# Patient Record
Sex: MALE | Race: BLACK OR AFRICAN AMERICAN | NOT HISPANIC OR LATINO | URBAN - METROPOLITAN AREA
[De-identification: names, ages, dates, MRNs, and addresses within clinical notes are randomized per-mention and may not be internally consistent; named-entity substitution may affect disease eponyms.]

---

## 2021-07-11 ENCOUNTER — EMERGENCY (EMERGENCY)
Facility: HOSPITAL | Age: 21
LOS: 1 days | Discharge: HOME | End: 2021-07-11
Attending: EMERGENCY MEDICINE | Admitting: EMERGENCY MEDICINE
Payer: MEDICAID

## 2021-07-11 VITALS
OXYGEN SATURATION: 100 % | RESPIRATION RATE: 16 BRPM | DIASTOLIC BLOOD PRESSURE: 82 MMHG | TEMPERATURE: 98 F | SYSTOLIC BLOOD PRESSURE: 135 MMHG | HEART RATE: 87 BPM

## 2021-07-11 VITALS
TEMPERATURE: 98 F | DIASTOLIC BLOOD PRESSURE: 83 MMHG | HEART RATE: 93 BPM | SYSTOLIC BLOOD PRESSURE: 139 MMHG | OXYGEN SATURATION: 100 % | RESPIRATION RATE: 18 BRPM

## 2021-07-11 DIAGNOSIS — F20.0 PARANOID SCHIZOPHRENIA: ICD-10-CM

## 2021-07-11 LAB
ALBUMIN SERPL ELPH-MCNC: 4.4 G/DL — SIGNIFICANT CHANGE UP (ref 3.3–5)
ALP SERPL-CCNC: 46 U/L — SIGNIFICANT CHANGE UP (ref 40–120)
ALT FLD-CCNC: 28 U/L — SIGNIFICANT CHANGE UP (ref 4–41)
AMPHET UR-MCNC: NEGATIVE — SIGNIFICANT CHANGE UP
ANION GAP SERPL CALC-SCNC: 18 MMOL/L — HIGH (ref 7–14)
APAP SERPL-MCNC: <15 UG/ML — SIGNIFICANT CHANGE UP (ref 15–25)
APPEARANCE UR: CLEAR — SIGNIFICANT CHANGE UP
AST SERPL-CCNC: 22 U/L — SIGNIFICANT CHANGE UP (ref 4–40)
B PERT DNA SPEC QL NAA+PROBE: SIGNIFICANT CHANGE UP
BACTERIA # UR AUTO: NEGATIVE — SIGNIFICANT CHANGE UP
BARBITURATES UR SCN-MCNC: NEGATIVE — SIGNIFICANT CHANGE UP
BASOPHILS # BLD AUTO: 0.03 K/UL — SIGNIFICANT CHANGE UP (ref 0–0.2)
BASOPHILS NFR BLD AUTO: 0.7 % — SIGNIFICANT CHANGE UP (ref 0–2)
BENZODIAZ UR-MCNC: NEGATIVE — SIGNIFICANT CHANGE UP
BILIRUB SERPL-MCNC: 0.3 MG/DL — SIGNIFICANT CHANGE UP (ref 0.2–1.2)
BILIRUB UR-MCNC: NEGATIVE — SIGNIFICANT CHANGE UP
BUN SERPL-MCNC: 14 MG/DL — SIGNIFICANT CHANGE UP (ref 7–23)
C PNEUM DNA SPEC QL NAA+PROBE: SIGNIFICANT CHANGE UP
CALCIUM SERPL-MCNC: 10.1 MG/DL — SIGNIFICANT CHANGE UP (ref 8.4–10.5)
CHLORIDE SERPL-SCNC: 103 MMOL/L — SIGNIFICANT CHANGE UP (ref 98–107)
CO2 SERPL-SCNC: 15 MMOL/L — LOW (ref 22–31)
COCAINE METAB.OTHER UR-MCNC: NEGATIVE — SIGNIFICANT CHANGE UP
COLOR SPEC: YELLOW — SIGNIFICANT CHANGE UP
COVID-19 SPIKE DOMAIN AB INTERP: POSITIVE
COVID-19 SPIKE DOMAIN ANTIBODY RESULT: 0.96 U/ML — HIGH
CREAT SERPL-MCNC: 0.97 MG/DL — SIGNIFICANT CHANGE UP (ref 0.5–1.3)
CREATININE URINE RESULT, DAU: 528 MG/DL — SIGNIFICANT CHANGE UP
DIFF PNL FLD: NEGATIVE — SIGNIFICANT CHANGE UP
EOSINOPHIL # BLD AUTO: 0.1 K/UL — SIGNIFICANT CHANGE UP (ref 0–0.5)
EOSINOPHIL NFR BLD AUTO: 2.3 % — SIGNIFICANT CHANGE UP (ref 0–6)
EPI CELLS # UR: 1 /HPF — SIGNIFICANT CHANGE UP (ref 0–5)
ETHANOL SERPL-MCNC: <10 MG/DL — SIGNIFICANT CHANGE UP
FLUAV SUBTYP SPEC NAA+PROBE: SIGNIFICANT CHANGE UP
FLUBV RNA SPEC QL NAA+PROBE: SIGNIFICANT CHANGE UP
GLUCOSE SERPL-MCNC: 94 MG/DL — SIGNIFICANT CHANGE UP (ref 70–99)
GLUCOSE UR QL: NEGATIVE — SIGNIFICANT CHANGE UP
HADV DNA SPEC QL NAA+PROBE: SIGNIFICANT CHANGE UP
HCOV 229E RNA SPEC QL NAA+PROBE: SIGNIFICANT CHANGE UP
HCOV HKU1 RNA SPEC QL NAA+PROBE: SIGNIFICANT CHANGE UP
HCOV NL63 RNA SPEC QL NAA+PROBE: SIGNIFICANT CHANGE UP
HCOV OC43 RNA SPEC QL NAA+PROBE: SIGNIFICANT CHANGE UP
HCT VFR BLD CALC: 40.7 % — SIGNIFICANT CHANGE UP (ref 39–50)
HGB BLD-MCNC: 13.1 G/DL — SIGNIFICANT CHANGE UP (ref 13–17)
HMPV RNA SPEC QL NAA+PROBE: SIGNIFICANT CHANGE UP
HPIV1 RNA SPEC QL NAA+PROBE: SIGNIFICANT CHANGE UP
HPIV2 RNA SPEC QL NAA+PROBE: SIGNIFICANT CHANGE UP
HPIV3 RNA SPEC QL NAA+PROBE: SIGNIFICANT CHANGE UP
HPIV4 RNA SPEC QL NAA+PROBE: SIGNIFICANT CHANGE UP
HYALINE CASTS # UR AUTO: NEGATIVE — SIGNIFICANT CHANGE UP (ref 0–7)
IANC: 2.21 K/UL — SIGNIFICANT CHANGE UP (ref 1.5–8.5)
IMM GRANULOCYTES NFR BLD AUTO: 0.2 % — SIGNIFICANT CHANGE UP (ref 0–1.5)
KETONES UR-MCNC: NEGATIVE — SIGNIFICANT CHANGE UP
LEUKOCYTE ESTERASE UR-ACNC: NEGATIVE — SIGNIFICANT CHANGE UP
LYMPHOCYTES # BLD AUTO: 1.46 K/UL — SIGNIFICANT CHANGE UP (ref 1–3.3)
LYMPHOCYTES # BLD AUTO: 34 % — SIGNIFICANT CHANGE UP (ref 13–44)
MCHC RBC-ENTMCNC: 26.1 PG — LOW (ref 27–34)
MCHC RBC-ENTMCNC: 32.2 GM/DL — SIGNIFICANT CHANGE UP (ref 32–36)
MCV RBC AUTO: 81.2 FL — SIGNIFICANT CHANGE UP (ref 80–100)
METHADONE UR-MCNC: NEGATIVE — SIGNIFICANT CHANGE UP
MONOCYTES # BLD AUTO: 0.49 K/UL — SIGNIFICANT CHANGE UP (ref 0–0.9)
MONOCYTES NFR BLD AUTO: 11.4 % — SIGNIFICANT CHANGE UP (ref 2–14)
NEUTROPHILS # BLD AUTO: 2.21 K/UL — SIGNIFICANT CHANGE UP (ref 1.8–7.4)
NEUTROPHILS NFR BLD AUTO: 51.4 % — SIGNIFICANT CHANGE UP (ref 43–77)
NITRITE UR-MCNC: NEGATIVE — SIGNIFICANT CHANGE UP
NRBC # BLD: 0 /100 WBCS — SIGNIFICANT CHANGE UP
NRBC # FLD: 0 K/UL — SIGNIFICANT CHANGE UP
OPIATES UR-MCNC: NEGATIVE — SIGNIFICANT CHANGE UP
OXYCODONE UR-MCNC: NEGATIVE — SIGNIFICANT CHANGE UP
PCP SPEC-MCNC: SIGNIFICANT CHANGE UP
PCP UR-MCNC: NEGATIVE — SIGNIFICANT CHANGE UP
PH UR: 6.5 — SIGNIFICANT CHANGE UP (ref 5–8)
PLATELET # BLD AUTO: 270 K/UL — SIGNIFICANT CHANGE UP (ref 150–400)
POTASSIUM SERPL-MCNC: 4.3 MMOL/L — SIGNIFICANT CHANGE UP (ref 3.5–5.3)
POTASSIUM SERPL-SCNC: 4.3 MMOL/L — SIGNIFICANT CHANGE UP (ref 3.5–5.3)
PROT SERPL-MCNC: 7.7 G/DL — SIGNIFICANT CHANGE UP (ref 6–8.3)
PROT UR-MCNC: ABNORMAL
RAPID RVP RESULT: SIGNIFICANT CHANGE UP
RBC # BLD: 5.01 M/UL — SIGNIFICANT CHANGE UP (ref 4.2–5.8)
RBC # FLD: 13.3 % — SIGNIFICANT CHANGE UP (ref 10.3–14.5)
RBC CASTS # UR COMP ASSIST: SIGNIFICANT CHANGE UP /HPF (ref 0–4)
RSV RNA SPEC QL NAA+PROBE: SIGNIFICANT CHANGE UP
RV+EV RNA SPEC QL NAA+PROBE: SIGNIFICANT CHANGE UP
SALICYLATES SERPL-MCNC: <5 MG/DL — LOW (ref 15–30)
SARS-COV-2 IGG+IGM SERPL QL IA: 0.96 U/ML — HIGH
SARS-COV-2 IGG+IGM SERPL QL IA: POSITIVE
SARS-COV-2 RNA SPEC QL NAA+PROBE: SIGNIFICANT CHANGE UP
SODIUM SERPL-SCNC: 136 MMOL/L — SIGNIFICANT CHANGE UP (ref 135–145)
SP GR SPEC: 1.04 — HIGH (ref 1.01–1.02)
THC UR QL: NEGATIVE — SIGNIFICANT CHANGE UP
TOXICOLOGY SCREEN, DRUGS OF ABUSE, SERUM RESULT: SIGNIFICANT CHANGE UP
TSH SERPL-MCNC: 2.66 UIU/ML — SIGNIFICANT CHANGE UP (ref 0.27–4.2)
UROBILINOGEN FLD QL: ABNORMAL
VALPROATE SERPL-MCNC: <3.15 UG/ML — LOW (ref 50–100)
WBC # BLD: 4.3 K/UL — SIGNIFICANT CHANGE UP (ref 3.8–10.5)
WBC # FLD AUTO: 4.3 K/UL — SIGNIFICANT CHANGE UP (ref 3.8–10.5)
WBC UR QL: 2 /HPF — SIGNIFICANT CHANGE UP (ref 0–5)

## 2021-07-11 PROCEDURE — 90792 PSYCH DIAG EVAL W/MED SRVCS: CPT

## 2021-07-11 PROCEDURE — 99283 EMERGENCY DEPT VISIT LOW MDM: CPT

## 2021-07-11 NOTE — ED PROVIDER NOTE - PROGRESS NOTE DETAILS
Andrea WELLINGTON . Medical evaluation performed. There is no clinical evidence of intoxication or any acute medical problem requiring immediate intervention.  D/C to [place] via cab

## 2021-07-11 NOTE — ED ADULT NURSE NOTE - OBJECTIVE STATEMENT
Received pt in  pt calm & cooperative c/o CAH to hurt self pt denies hi/vh presently, pt tolerated breakfast ambulatory awaiting eval on going.

## 2021-07-11 NOTE — ED PROVIDER NOTE - CLINICAL SUMMARY MEDICAL DECISION MAKING FREE TEXT BOX
20 yo M pmh asthma, paranoid schizophrenia, MR, depression presents from [place] for auditory hallucinations and SI for last two weeks.  VSS.  Exam with flat affect, non-toxic appearing.  Denies self harm.  Will obtain  evaluation.  Dispo pending.

## 2021-07-11 NOTE — ED ADULT TRIAGE NOTE - CHIEF COMPLAINT QUOTE
from [facility] 72. states " I am hearing voices telling me to kill myself for 2 wks"

## 2021-07-11 NOTE — ED BEHAVIORAL HEALTH ASSESSMENT NOTE - RISK ASSESSMENT
Low Acute Suicide Risk some increased risk owing to CAH, psychosis, intellectual disability. however, he has no suicidal ideations, is afraid of death/does not want to die, wanted to come to the ED for treatment, no substance misuse. I do not consider him an acute risk to himself warranting involuntary hospitalization, patient declines voluntary admission

## 2021-07-11 NOTE — ED BEHAVIORAL HEALTH ASSESSMENT NOTE - DESCRIPTION
Vital Signs Last 24 Hrs  T(C): 36.7 (11 Jul 2021 08:46), Max: 36.7 (11 Jul 2021 08:46)  T(F): 98 (11 Jul 2021 08:46), Max: 98 (11 Jul 2021 08:46)  HR: 93 (11 Jul 2021 08:46) (93 - 93)  BP: 139/83 (11 Jul 2021 08:46) (139/83 - 139/83)  BP(mean): --  RR: 18 (11 Jul 2021 08:46) (18 - 18)  SpO2: 100% (11 Jul 2021 08:46) (100% - 100%) htn, increased lipids was homeless, now living in [facility]

## 2021-07-11 NOTE — ED BEHAVIORAL HEALTH NOTE - BEHAVIORAL HEALTH NOTE
Per provider, Dr. Vincent, patient is cleared and is able to return to their previous residence [address].  has spoken to [employer]  [phone],  confirmed that patients mode of transportation is [employer], and that patient travels INDEPENDENTLY. Clinical provider is in agreement with [employer]  back to group home. Verbal huddle regarding coordination of care completed with interdisciplinary team. Worker arranged for safe ride taxi ([phone]) for patient to return back to residence. Invoice #[id number]

## 2021-07-11 NOTE — ED PROVIDER NOTE - PHYSICAL EXAMINATION
GEN:  Non-toxic appearing, non-distressed, speaking full sentences, non-diaphoretic, AAOx3  HEENT:  NCAT, neck supple, EOMI, PERRLA, sclera anicteric, no conjunctival pallor or injection, no stridor, normal voice, no tonsillar exudate, uvula midline  CV:  regular rhythm and rate, s1/s2 audible, no murmurs, rubs or gallops, peripheral pulses 2+ and symmetric  PULM:  non-labored respirations, lungs clear to auscultation bilaterally, no wheezes, crackles or rales  ABD:  non distended, non-tender, no rebound, no guarding, negative Umana's sign, bowel sounds normal, no cvat  MSK:  no gross deformity, non-tender extremities and joints, range of motion grossly normal appearing, no extremity edema, extremities warm and well perfused   NEURO:  AAOx3, CN II-XII intact, motor 5/5 in upper and lower extremities bilaterally, sensation grossly intact in extremities and trunk, finger to nose testing wnl, no nystagmus, negative Romberg, no pronator drift, no gait deficit  SKIN:  warm, dry, no rash or vesicles

## 2021-07-11 NOTE — ED BEHAVIORAL HEALTH ASSESSMENT NOTE - SUMMARY
21 year-old with htn, increased lipids, intellectual disability and schizophrenia, living in [facility], with recent CAH in the context, perhaps of switching from haloperidol/VPA to olanzapine 20 mg. Patient denies any thoughts of hurting himself, reports that the hallucinations he has had before, "in the background" and is able to ignore them, presents requesting restarting previous meds 21 year-old with htn, increased lipids, intellectual disability and schizophrenia, living in [facility], with recent CAH in the context, perhaps of switching from haloperidol/VPA to olanzapine 20 mg. Patient denies any thoughts of hurting himself, reports that the hallucinations he has had before, "in the background" and is able to ignore them, presents requesting restarting previous meds. have discussed with staff at [facility], no acute safety concerns. I do not consider patient an acute risk to himself warranting involuntary psychiatric hospitalization, and patient declines voluntary admission. Plan is discharge with follow-up at [facility].

## 2021-07-11 NOTE — ED PROVIDER NOTE - OBJECTIVE STATEMENT
20 yo M pmh asthma, paranoid schizophrenia, MR, depression presents from [place] for auditory hallucinations and SI for last two weeks.  Patient states he is hearing voices commanding him to kill himself.  Reports history of suicidal ideation, but no previous attempts.  States he has a plan to jump off a bridge.  Denies recent self harm.  Denies etoh or illicit drug use.  Reports compliance with medication although is unsure of what he is taking.

## 2021-07-11 NOTE — ED BEHAVIORAL HEALTH ASSESSMENT NOTE - HPI (INCLUDE ILLNESS QUALITY, SEVERITY, DURATION, TIMING, CONTEXT, MODIFYING FACTORS, ASSOCIATED SIGNS AND SYMPTOMS)
21 year-old male, history of schizophrenia and comorbid intellectual disability, htn, increased lipids, living at [facility]/ [address] at [place], last seen in this ED 2014 while living in shelter with mother, no history of substance abuse, no history of self injury or suicide attempts, history of previous psychiatric hospitalization (most recent according to PSYCHES 8-9/2017 at [place]), treated by ACT team ([facility]) and Dr. Lu, was treated with  haloperidol 10 mg bid (formerly on dec) in addition to cogentin 2 mg,  mg. He is now off of haloperidol/VPA, and is on olanzapine 20 mg. Patient presents today, self-activating 911 as he reports that he has had "a pain in my head and I hear a voice telling me to jump off a bridge" for the past two weeks. He reports that he does not want to hurt himself, has heard this voice before off and on for several years, but believes that the voice got worse "after my doctor took me off of two medications" (haloperidol/VPA?). He reports that he last had a telehealth visit with his doctor a couple of weeks ago, reports that it is hard to get in touch with his doctor in between visits. He reports that he has not been sad or depressed, does not want to be in the hospital, came to the ED "to get those medicines back."   Spoke with Mari at [facility] ([phone]) and she reports that patient has not been distressed, has been calm and cooperative, has not appeared upset or depressed and was surprised when he reported hearing voices. She corroborates that he has been adherent with treatment, and that he has had no self injury. 21 year-old male, history of schizophrenia and comorbid intellectual disability, htn, increased lipids, living at [facility]/ [facility] [address] residence at [place], last seen in this ED 2014 while living in shelter with mother, no history of substance abuse, no history of self injury or suicide attempts, history of previous psychiatric hospitalization (most recent according to PSYCHES 8-9/2017 at [place]), treated by ACT team ([facility]) and Dr. Lu, was treated with  haloperidol 10 mg bid (formerly on dec) in addition to cogentin 2 mg,  mg. He is now off of haloperidol/VPA, and is on olanzapine 20 mg. Patient presents today, self-activating 911 as he reports that he has had "a pain in my head and I hear a voice telling me to jump off a bridge" for the past two weeks. He reports that he does not want to hurt himself, has heard this voice before off and on for several years, but believes that the voice got worse "after my doctor took me off of two medications" (haloperidol/VPA?). He reports that he last had a telehealth visit with his doctor a couple of weeks ago, reports that it is hard to get in touch with his doctor in between visits. He reports that he has not been sad or depressed, does not want to be in the hospital, came to the ED "to get those medicines back."   Spoke with Mari at [facility] ([phone]) and she reports that patient has not been distressed, has been calm and cooperative, has not appeared upset or depressed and was surprised when he reported hearing voices. She corroborates that he has been adherent with treatment, and that he has had no self injury. Also spoke with Daniella, medication nurse, who reports that he believes that VPA and haloperidol were discontinued owing to "side-effects" but the patient is unaware of any side-effects, and Daniella deferred to Dr. Lu at [facility]. I attempted to make contact with Dr. Zuniga at [facility] at [phone], left message.

## 2021-07-13 NOTE — ED POST DISCHARGE NOTE - REASON FOR FOLLOW-UP
COVID-19 AB : detected. message left with Call Back  P.A. number and hours for return call back. Other

## 2021-09-03 ENCOUNTER — EMERGENCY (EMERGENCY)
Facility: HOSPITAL | Age: 21
LOS: 1 days | Discharge: HOME | End: 2021-09-03
Attending: EMERGENCY MEDICINE | Admitting: EMERGENCY MEDICINE
Payer: MEDICAID

## 2021-09-03 VITALS
HEART RATE: 90 BPM | TEMPERATURE: 98 F | DIASTOLIC BLOOD PRESSURE: 60 MMHG | OXYGEN SATURATION: 100 % | RESPIRATION RATE: 16 BRPM | SYSTOLIC BLOOD PRESSURE: 120 MMHG

## 2021-09-03 PROCEDURE — 99284 EMERGENCY DEPT VISIT MOD MDM: CPT | Mod: 25

## 2021-09-03 PROCEDURE — 93010 ELECTROCARDIOGRAM REPORT: CPT

## 2021-09-03 RX ORDER — ONDANSETRON 8 MG/1
4 TABLET, FILM COATED ORAL ONCE
Refills: 0 | Status: COMPLETED | OUTPATIENT
Start: 2021-09-03 | End: 2021-09-03

## 2021-09-03 RX ORDER — FAMOTIDINE 10 MG/ML
20 INJECTION INTRAVENOUS ONCE
Refills: 0 | Status: COMPLETED | OUTPATIENT
Start: 2021-09-03 | End: 2021-09-03

## 2021-09-03 RX ADMIN — ONDANSETRON 4 MILLIGRAM(S): 8 TABLET, FILM COATED ORAL at 14:47

## 2021-09-03 RX ADMIN — Medication 30 MILLILITER(S): at 14:47

## 2021-09-03 RX ADMIN — FAMOTIDINE 20 MILLIGRAM(S): 10 INJECTION INTRAVENOUS at 14:47

## 2021-09-03 NOTE — ED PROVIDER NOTE - CLINICAL SUMMARY MEDICAL DECISION MAKING FREE TEXT BOX
O'Nathan DO PGY-2: pt w/ one year of abd pain and cp (both described as burning). CP not made worse w/ exertion. States it is just constant and baseline. Probable GERD sky since pt only consumed coffee today. Will obtain screening ekg. Ordered GI meds. TBDC w/ GI follow up.

## 2021-09-03 NOTE — ED ADULT NURSE NOTE - OBJECTIVE STATEMENT
Pt received to INT10[facility]. A&Ox4. Ambulatory at baseline.  Pt c/o of abdominal pain for 1x year that has worsened. Pain 8/10 at rest and with activity. Pt states he didn't take anything to relieve the pain. Pt states he has SOB and midsternal chest pain and the pain that radiates bilaterally to the arms. Pt denies any N/V. Pt has flat affect with one word responses. Respirations are even, unlabored, no use of accessory muscles. Abdomen soft and nontender. Skin warm, dry and intact. Medicated as per MD. Deondre CTM.

## 2021-09-03 NOTE — ED PROVIDER NOTE - NSFOLLOWUPCLINICS_GEN_ALL_ED_FT
[facility]  Cardiology  [address] [place] [address]  Phone: [phone]  Fax:   Follow Up Time: 1-3 Days    Gastroenterology at [facility]  Gastroenterology  [address] [place] [address]  Phone: [phone]  Fax:   Follow Up Time: 1-3 Days

## 2021-09-03 NOTE — PROVIDER CONTACT NOTE (OTHER) - ASSESSMENT
Pt is from [address], spoke with [place] Counselor at [phone], confirmed he is a resident, and asked to send him by bus, I gave him a metro card, pt walked out. Verbal huddle completed.

## 2021-09-03 NOTE — ED ADULT TRIAGE NOTE - CHIEF COMPLAINT QUOTE
Pt states that he has been having abd pain for the last year, which made him nauseous today.  PMH schizophrenia

## 2021-09-03 NOTE — ED PROVIDER NOTE - NS ED ROS FT
CONSTITUTIONAL - No fever, No diaphoresis, No weight change  EYES - No eye pain, No blurred vision  ENT - No change in hearing, No sore throat, No neck pain, No rhinorrhea, No ear pain  RESPIRATORY - No shortness of breath, No cough  CARDIAC +chest pain, No palpitations  GI +abdominal pain, +nausea, No vomiting, No diarrhea, No constipation  - No dysuria, no frequency, no hematuria.   MUSCULOSKELETAL - No joint pain, No swelling, No back pain  NEUROLOGIC - No numbness, No focal weakness, No headache, No dizziness

## 2021-09-03 NOTE — ED PROVIDER NOTE - ATTENDING CONTRIBUTION TO CARE
DR. CHINCHILLA, ATTENDING MD-  I performed a face to face bedside interview with the patient regarding history of present illness, review of symptoms and past medical history. I completed an independent physical exam.  I have discussed the patient's plan of care with the resident.   Documentation as above in the note.    22 y/o male h/o schizophrenia here with abd pain x1 year, epigastric, burning, rad to chest occasionally.  Follows with a gi md o/p.  Abd soft non ttp no r/g neg murphys.  Likely gerd/gastritis.  Give gi cocktail obtain ekg antic dc with pcp and gi f/u as o/p.

## 2021-09-03 NOTE — ED PROVIDER NOTE - OBJECTIVE STATEMENT
22 y/o M w/ pmhx of schizophrenia presents today c/o abd pain and cp that has been going on for one year. Pain is both described as a burning sensation. CP not made worse w/ exertion. States it is just constant and "always the same". For abd it is localized to epigastric area. Pt states he came in today bc he felt nauseous for the first time. Denies vomiting. Pt denies recent f/c, dysuria, diarrhea. Pt does not know what psych meds he is on. He states he is from [place]. Pt denies SI or HI. Pt denies tobacco, alcohol or recreational drug use

## 2021-09-03 NOTE — ED PROVIDER NOTE - PATIENT PORTAL LINK FT
You can access the FollowMyHealth Patient Portal offered by [facility] by registering at the following website: [email or web address]. By joining [facility]’s FollowMyHealth portal, you will also be able to view your health information using other applications (apps) compatible with our system.

## 2021-09-03 NOTE — ED PROVIDER NOTE - NSFOLLOWUPINSTRUCTIONS_ED_ALL_ED_FT
(1) Follow up with your primary care physician as discussed. Listed below are the specialists that will be necessary to see as an outpatient to continue the workup.  Please call the numbers listed below or [phone] to set up the necessary appointments.  (2) Immediately seek care at your nearest emergency room if your symptoms worsen, persist, or do not resolve   (3) Take Tylenol as needed for pain per the dosing instructions on the bottle.      Acute Nausea and Vomiting    WHAT YOU NEED TO KNOW:    Acute nausea and vomiting start suddenly, worsen quickly, and last a short time.    DISCHARGE INSTRUCTIONS:    Return to the emergency department if:   •You see blood in your vomit or your bowel movements.  •You have sudden, severe pain in your chest and upper abdomen after hard vomiting or retching.  •You have swelling in your neck and chest.   •You are dizzy, cold, and thirsty and your eyes and mouth are dry.  •You are urinating very little or not at all.  •You have muscle weakness, leg cramps, and trouble breathing.   •Your heart is beating much faster than normal.   •You continue to vomit for more than 48 hours.     Contact your healthcare provider if:   •You have frequent dry heaves (vomiting but nothing comes out).  •Your nausea and vomiting does not get better or go away after you use medicine.  •You have questions or concerns about your condition or treatment. (1) Follow up with your primary care physician as discussed. Listed below are the specialists that will be necessary to see as an outpatient to continue the workup.  Please call the numbers listed below or [phone] to set up the necessary appointments.  (2) Immediately seek care at your nearest emergency room if your symptoms worsen, persist, or do not resolve   (3) Take Tylenol as needed for pain per the dosing instructions on the bottle.  (4) Take famotidine 10mg twice a day for the abdominal pain.       Acute Nausea and Vomiting    WHAT YOU NEED TO KNOW:    Acute nausea and vomiting start suddenly, worsen quickly, and last a short time.    DISCHARGE INSTRUCTIONS:    Return to the emergency department if:   •You see blood in your vomit or your bowel movements.  •You have sudden, severe pain in your chest and upper abdomen after hard vomiting or retching.  •You have swelling in your neck and chest.   •You are dizzy, cold, and thirsty and your eyes and mouth are dry.  •You are urinating very little or not at all.  •You have muscle weakness, leg cramps, and trouble breathing.   •Your heart is beating much faster than normal.   •You continue to vomit for more than 48 hours.     Contact your healthcare provider if:   •You have frequent dry heaves (vomiting but nothing comes out).  •Your nausea and vomiting does not get better or go away after you use medicine.  •You have questions or concerns about your condition or treatment.

## 2021-09-03 NOTE — ED PROVIDER NOTE - PROGRESS NOTE DETAILS
Na WOLFE PGY-2: Results explained to patient. Explained strict return precautions. OEliza DO PGY-2: contacted SW for dc as pt is from [facility]

## 2021-10-05 ENCOUNTER — EMERGENCY (EMERGENCY)
Facility: HOSPITAL | Age: 21
LOS: 1 days | Discharge: HOME | End: 2021-10-05
Attending: EMERGENCY MEDICINE | Admitting: EMERGENCY MEDICINE
Payer: MEDICAID

## 2021-10-05 VITALS
SYSTOLIC BLOOD PRESSURE: 129 MMHG | HEART RATE: 109 BPM | OXYGEN SATURATION: 99 % | DIASTOLIC BLOOD PRESSURE: 88 MMHG | TEMPERATURE: 98 F | RESPIRATION RATE: 16 BRPM

## 2021-10-05 PROCEDURE — 99284 EMERGENCY DEPT VISIT MOD MDM: CPT

## 2021-10-05 NOTE — ED PROVIDER NOTE - NSFOLLOWUPINSTRUCTIONS_ED_ALL_ED_FT
Please return to the ER if you want to harm yourself or others or if you feel unsafe for any reason.
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
## 2021-10-05 NOTE — ED BEHAVIORAL HEALTH NOTE - BEHAVIORAL HEALTH NOTE
Writer called [address] [place] [address] [phone] spoke to [facility] rehab associate.  He states patient resides independently in congregate apartment treatment.  Patient has been in his own apartment since February 2021.  Patient has his own kitchen and bathroom.  Pt takes care of purchasing his own food and cooking his own food.  Patient has been functioning independently since he's been at [address] [place] [address].  He states patient seemed agitated this morning, requested to go to the hospital today.  He states patient spoke to  staff then went outside the building.   staff called EMS.  He does not know if patient has any specific grievance with another client.  He does not know if patient threatened any other client specifically.  He states  might have more information as to wether patient has an issue with another client, but they are short staffed and there are no  until 2:30pm today.  He states patient has been compliant with medication.  Last night patient told staff he was going for an overnight, packed his belongings and medications for today.  He states for some reason patient did not leave.  Patient does spend a few overnights a week with family. He doesn't know what transpired last night that patient did not go to his family.  He states patient travels independently and can return via public transportation.
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
## 2021-10-05 NOTE — ED PROVIDER NOTE - PHYSICAL EXAMINATION
GEN - NAD; well appearing; A+O x3   HEAD - NC/AT   ENT: Airway patent, mmm  NECK: Neck supple  PULMONARY - Non labored breathing  EXTREMITIES - moving all four extremities  NEUROLOGIC - alert, speech clear, normal gait  PSYCH -calm and cooperative, answering questions appropriately

## 2021-10-05 NOTE — ED PROVIDER NOTE - PATIENT PORTAL LINK FT
You can access the FollowMyHealth Patient Portal offered by [facility] by registering at the following website: [email or web address]. By joining [facility]’s FollowMyHealth portal, you will also be able to view your health information using other applications (apps) compatible with our system.

## 2021-10-05 NOTE — ED PROVIDER NOTE - CLINICAL SUMMARY MEDICAL DECISION MAKING FREE TEXT BOX
Pt presents from [place] for eval after episode of agitation. Pt calm and cooperative now, currently not a danger to self or others, will send back to [place].

## 2021-11-21 ENCOUNTER — EMERGENCY (EMERGENCY)
Facility: HOSPITAL | Age: 21
LOS: 1 days | Discharge: HOME | End: 2021-11-21
Attending: STUDENT IN AN ORGANIZED HEALTH CARE EDUCATION/TRAINING PROGRAM | Admitting: STUDENT IN AN ORGANIZED HEALTH CARE EDUCATION/TRAINING PROGRAM
Payer: MEDICAID

## 2021-11-21 VITALS
TEMPERATURE: 98 F | SYSTOLIC BLOOD PRESSURE: 134 MMHG | DIASTOLIC BLOOD PRESSURE: 96 MMHG | RESPIRATION RATE: 16 BRPM | OXYGEN SATURATION: 100 % | HEART RATE: 85 BPM

## 2021-11-21 VITALS
DIASTOLIC BLOOD PRESSURE: 86 MMHG | HEART RATE: 80 BPM | TEMPERATURE: 98 F | RESPIRATION RATE: 18 BRPM | SYSTOLIC BLOOD PRESSURE: 130 MMHG | OXYGEN SATURATION: 100 %

## 2021-11-21 PROCEDURE — 99284 EMERGENCY DEPT VISIT MOD MDM: CPT

## 2021-11-21 RX ORDER — ONDANSETRON 8 MG/1
4 TABLET, FILM COATED ORAL ONCE
Refills: 0 | Status: COMPLETED | OUTPATIENT
Start: 2021-11-21 | End: 2021-11-21

## 2021-11-21 RX ORDER — PANTOPRAZOLE SODIUM 20 MG/1
1 TABLET, DELAYED RELEASE ORAL
Qty: 10 | Refills: 0
Start: 2021-11-21 | End: 2021-11-30

## 2021-11-21 RX ORDER — FAMOTIDINE 10 MG/ML
1 INJECTION INTRAVENOUS
Qty: 10 | Refills: 0
Start: 2021-11-21 | End: 2021-11-30

## 2021-11-21 RX ORDER — ACETAMINOPHEN 500 MG
650 TABLET ORAL ONCE
Refills: 0 | Status: COMPLETED | OUTPATIENT
Start: 2021-11-21 | End: 2021-11-21

## 2021-11-21 RX ORDER — FAMOTIDINE 10 MG/ML
20 INJECTION INTRAVENOUS ONCE
Refills: 0 | Status: COMPLETED | OUTPATIENT
Start: 2021-11-21 | End: 2021-11-21

## 2021-11-21 RX ORDER — LIDOCAINE 4 G/100G
5 CREAM TOPICAL ONCE
Refills: 0 | Status: COMPLETED | OUTPATIENT
Start: 2021-11-21 | End: 2021-11-21

## 2021-11-21 RX ORDER — SUCRALFATE 1 G
1 TABLET ORAL ONCE
Refills: 0 | Status: COMPLETED | OUTPATIENT
Start: 2021-11-21 | End: 2021-11-21

## 2021-11-21 RX ADMIN — LIDOCAINE 5 MILLILITER(S): 4 CREAM TOPICAL at 07:15

## 2021-11-21 RX ADMIN — Medication 1 GRAM(S): at 07:15

## 2021-11-21 RX ADMIN — Medication 650 MILLIGRAM(S): at 07:15

## 2021-11-21 RX ADMIN — FAMOTIDINE 20 MILLIGRAM(S): 10 INJECTION INTRAVENOUS at 07:15

## 2021-11-21 RX ADMIN — ONDANSETRON 4 MILLIGRAM(S): 8 TABLET, FILM COATED ORAL at 07:49

## 2021-11-21 RX ADMIN — Medication 30 MILLILITER(S): at 07:15

## 2021-11-21 NOTE — ED PROVIDER NOTE - NS ED ROS FT
Gen: No F/C/NS  Head: No falls/head trauma  Eyes: No changes in vision   Resp: No cough or trouble breathing  Cardiovascular: No chest pain or palpitation  Gastroenteric: +nausea, +vomting   :  No change in urine output, dysuria or hematuria   MS: No joint or muscle pain  Neuro: No headache   Skin: No new rash

## 2021-11-21 NOTE — ED PROVIDER NOTE - PHYSICAL EXAMINATION
G: NAD, cooperative with exam   H: NCAT  E: EOMI, no conjunctival pallor   M: Mucous membranes moist   R: CTABL, nWOB  C: Nl S1/S2, no mrg  A: Soft, NT/ND, no rebound/guarding   MSK: no calf tenderness, no LE edema

## 2021-11-21 NOTE — ED PROVIDER NOTE - PROGRESS NOTE DETAILS
Rosetta Walker MD (PGY2) -  Pt seen & reassessed.  Pt symptomatically improved.  NAD, VSS, pt tolerated PO & ambulated w/steady unassisted gait in the ED.  We discussed the results of ED w/u w/patient (incl. presumptive Emergency Department dx, associated anticipatory guidance, stressing importance of prompt f/u, return precautions), & gave them a copy of results.  Patient verbalized understanding of ED course & agreed with our f/u recommendations, has decisional making capacity.  Pt st they will f/u w/PMD within the next 3 days; pt agrees to call today or tomorrow for an appointment. Pt agrees to return to the ED if there is any worsening or concerning symptoms. SW called for disposition.

## 2021-11-21 NOTE — ED PROVIDER NOTE - NSFOLLOWUPCLINICS_GEN_ALL_ED_FT
Gastroenterology at [facility]  Gastroenterology  [address] [place] [address]  Phone: [phone]  Fax:

## 2021-11-21 NOTE — PROVIDER CONTACT NOTE (OTHER) - ASSESSMENT
Rosetta Walker) referred this case as pt is from [facility] . Writer resides at [facility] [phone].  Writer spoke with renetta Guy  at [phone]  and requested send the pt via cab.  Writer arranged trip via  [employer] online portal #  trip invoice number #  [id number].  Writer reached out to [employer] at [phone] spoke with Ms. Mandujano informed that pt will be picked up at 9:00 am and writer and informed cab has to come to adult ED ramp to . Writer informed the medical team and the pt on this intervention and plan and in agreement. Verbal Huddle been completed. NO further SW intervention needed for this visit. Rosetta Walker) referred this case as pt is from [facility] . Writer resides at [facility] [phone].  Writer spoke with counselor Malena  at [phone]  and requested send the pt via cab.  Writer arranged trip via  [employer] online portal #  trip invoice number #  [id number].  Writer reached out to [employer] at [phone] spoke with Ms. Mandujano informed that pt will be picked up at 9:00 am and writer and informed cab has to come to adult ED ramp to . Writer informed the medical team and the pt on this intervention and plan and in agreement. Verbal Huddle been completed. NO further SW intervention needed for this visit.

## 2021-11-21 NOTE — ED PROVIDER NOTE - ATTENDING CONTRIBUTION TO CARE
I (Sonal) agree with above, I performed a history and physical. Counseled purvi medical staff, physician assistant, and/or medical student on medical decision making as documented. Medical decisions and treatment interventions were made in real time during the patient encounter. Additionally and/or with the following exceptions: The patient presented to the ED with burning epigastric pain that has been ongoing for 1 month, worse after food. Has had a endoscopy in the past which was significant for gastritis, as per the patient. abdomen soft, non-tender, non-distended . Patient felt better after treatment for gastritis, given famotidine rx and recommended GI follow up. BRIANA avina performed, signed out to ouncoming attending pending final release via cab back to [facility]. Return precautions reviewed. Patient verbalized understand of conditions for return and plan for follow up.

## 2021-11-21 NOTE — ED ADULT NURSE NOTE - CHIEF COMPLAINT QUOTE
Arrives from [address]. C/o midsternal chest pain that began "months ago." Also c/o vomiting that began tonight. PMHx: asthma, schizophrenia, GERD

## 2021-11-21 NOTE — ED PROVIDER NOTE - CLINICAL SUMMARY MEDICAL DECISION MAKING FREE TEXT BOX
20yo M PMH hiatal hernia, schizophrenia, GERD presents with epigastric burning sensation, nausea and vomiting. Well appearing on exam. Abd soft NTND. No rash. Plan to give meds, reassess.

## 2021-11-21 NOTE — ED ADULT NURSE NOTE - OBJECTIVE STATEMENT
pt received to 21, a&ox 4 and ambulatory, p/w chest pain which pt stated started months ago. Verbalized nausea and vomit since 1am this morning. Denies bloody vomit. Abdomen soft, non-tender, non-distended with positive bowel sounds on all 4 quadrants. Pt breathing even and unlabored on room air. NSR on cardiac monitor. Denies fever, chills, cough, SOB,  palpitations, constipation. Meds administered as ordered. Stretcher in lowest position, wheels locked, appropriate side rails in place, call bell in reach.

## 2021-11-21 NOTE — ED ADULT TRIAGE NOTE - CHIEF COMPLAINT QUOTE
Arrives from [address]. C/o midsternal chest pain that began "months ago." Also c/o vomiting that began tonight. PMHx: asthma, schizophrenia, GERD

## 2021-11-21 NOTE — ED PROVIDER NOTE - PATIENT PORTAL LINK FT
You can access the FollowMyHealth Patient Portal offered by [facility] by registering at the following website: [email or web address]. By joining [employer]’s FollowMyHealth portal, you will also be able to view your health information using other applications (apps) compatible with our system.

## 2021-11-21 NOTE — ED PROVIDER NOTE - NSFOLLOWUPINSTRUCTIONS_ED_ALL_ED_FT
Please take Pepcid one time per day for your symptoms.  Please follow up with your gastroenterologist.     Gastritis    WHAT YOU NEED TO KNOW:    Gastritis is inflammation or irritation of the lining of your stomach.     Abdominal Organs         DISCHARGE INSTRUCTIONS:    Call 911 for any of the following:   •You develop chest pain or shortness of breath.          Return to the emergency department if:   •You vomit blood.      •You have black or bloody bowel movements.      •You have severe stomach or back pain.      Contact your healthcare provider if:   •You have a fever.      •You have new or worsening symptoms, even after treatment.      •You have questions or concerns about your condition or care.      Medicines:   •Medicines may be given to help treat a bacterial infection or decrease stomach acid.       •Take your medicine as directed. Contact your healthcare provider if you think your medicine is not helping or if you have side effects. Tell him or her if you are allergic to any medicine. Keep a list of the medicines, vitamins, and herbs you take. Include the amounts, and when and why you take them. Bring the list or the pill bottles to follow-up visits. Carry your medicine list with you in case of an emergency.      Manage or prevent gastritis:   •Do not smoke. Nicotine and other chemicals in cigarettes and cigars can make your symptoms worse and cause lung damage. Ask your healthcare provider for information if you currently smoke and need help to quit. E-cigarettes or smokeless tobacco still contain nicotine. Talk to your healthcare provider before you use these products.       •Do not drink alcohol. Alcohol can prevent healing and make your gastritis worse. Talk to your healthcare provider if you need help to stop drinking.      •Do not take NSAIDs or aspirin unless directed. These and similar medicines can cause irritation. If your healthcare provider says it is okay to take NSAIDs, take them with food.       •Do not eat foods that cause irritation. Foods such as oranges and salsa can cause burning or pain. Eat a variety of healthy foods. Examples include fruits (not citrus), vegetables, low-fat dairy products, beans, whole-grain breads, and lean meats and fish. Try to eat small meals, and drink water with your meals. Do not eat for at least 3 hours before you go to bed.      •Find ways to relax and decrease stress. Stress can increase stomach acid and make gastritis worse. Activities such as yoga, meditation, or listening to music can help you relax. Spend time with friends, or do things you enjoy.      Follow up with your healthcare provider as directed: You may need ongoing tests or treatment, or referral to a gastroenterologist. Write down your questions so you remember to ask them during your visits.

## 2021-11-21 NOTE — ED PROVIDER NOTE - OBJECTIVE STATEMENT
Detail Level: Detailed Detail Level: Zone 22yo M PMH hiatal hernia, schizophrenia, GERD presents with epigastric burning sensation, nausea and vomiting. Onset 1am, woke him from sleep. States he vomited 7 times, went back to sleep. Woke up at 5:30am with the same sxs, vomited once. Has not taken any meds for relief of symptoms. Endorsing nausea. Burning sensation in his chest and epigastrium. Ongoing for 2 years, burning sensation occurs after every feed. Seen by GI 2 yrs ago, had an endoscopy, informed he had a hiatal hernia at that time. Has not followed up since. Ate BLT yesterday. Did not have anything for dinner. No SOB, palpitations, abd pain, diarrhea, sick contacts. Detail Level: Generalized

## 2022-01-19 NOTE — ED ADULT NURSE NOTE - NSSEPSISSUSPECTED_ED_A_ED
- patient required urinary catheterization multiple times last night    - continue urinary retention protocol   - Flomax initiated No

## 2022-04-08 ENCOUNTER — EMERGENCY (EMERGENCY)
Facility: HOSPITAL | Age: 22
LOS: 1 days | Discharge: HOME | End: 2022-04-08
Attending: EMERGENCY MEDICINE | Admitting: EMERGENCY MEDICINE
Payer: MEDICAID

## 2022-04-08 VITALS
RESPIRATION RATE: 20 BRPM | OXYGEN SATURATION: 100 % | TEMPERATURE: 98 F | DIASTOLIC BLOOD PRESSURE: 94 MMHG | SYSTOLIC BLOOD PRESSURE: 198 MMHG | HEART RATE: 95 BPM

## 2022-04-08 VITALS
DIASTOLIC BLOOD PRESSURE: 86 MMHG | RESPIRATION RATE: 18 BRPM | TEMPERATURE: 98 F | HEART RATE: 74 BPM | SYSTOLIC BLOOD PRESSURE: 122 MMHG | OXYGEN SATURATION: 100 %

## 2022-04-08 LAB
ALBUMIN SERPL ELPH-MCNC: 4.4 G/DL — SIGNIFICANT CHANGE UP (ref 3.3–5)
ALP SERPL-CCNC: 53 U/L — SIGNIFICANT CHANGE UP (ref 40–120)
ALT FLD-CCNC: 19 U/L — SIGNIFICANT CHANGE UP (ref 4–41)
ANION GAP SERPL CALC-SCNC: 13 MMOL/L — SIGNIFICANT CHANGE UP (ref 7–14)
APPEARANCE UR: CLEAR — SIGNIFICANT CHANGE UP
AST SERPL-CCNC: 16 U/L — SIGNIFICANT CHANGE UP (ref 4–40)
BASOPHILS # BLD AUTO: 0.03 K/UL — SIGNIFICANT CHANGE UP (ref 0–0.2)
BASOPHILS NFR BLD AUTO: 0.4 % — SIGNIFICANT CHANGE UP (ref 0–2)
BILIRUB SERPL-MCNC: <0.2 MG/DL — SIGNIFICANT CHANGE UP (ref 0.2–1.2)
BILIRUB UR-MCNC: NEGATIVE — SIGNIFICANT CHANGE UP
BUN SERPL-MCNC: 15 MG/DL — SIGNIFICANT CHANGE UP (ref 7–23)
CALCIUM SERPL-MCNC: 9.4 MG/DL — SIGNIFICANT CHANGE UP (ref 8.4–10.5)
CHLORIDE SERPL-SCNC: 104 MMOL/L — SIGNIFICANT CHANGE UP (ref 98–107)
CO2 SERPL-SCNC: 22 MMOL/L — SIGNIFICANT CHANGE UP (ref 22–31)
COLOR SPEC: YELLOW — SIGNIFICANT CHANGE UP
CREAT SERPL-MCNC: 1 MG/DL — SIGNIFICANT CHANGE UP (ref 0.5–1.3)
DIFF PNL FLD: NEGATIVE — SIGNIFICANT CHANGE UP
EGFR: 108 ML/MIN/1.73M2 — SIGNIFICANT CHANGE UP
EOSINOPHIL # BLD AUTO: 0.21 K/UL — SIGNIFICANT CHANGE UP (ref 0–0.5)
EOSINOPHIL NFR BLD AUTO: 2.8 % — SIGNIFICANT CHANGE UP (ref 0–6)
GLUCOSE SERPL-MCNC: 78 MG/DL — SIGNIFICANT CHANGE UP (ref 70–99)
GLUCOSE UR QL: NEGATIVE — SIGNIFICANT CHANGE UP
HCT VFR BLD CALC: 40 % — SIGNIFICANT CHANGE UP (ref 39–50)
HGB BLD-MCNC: 12.9 G/DL — LOW (ref 13–17)
IANC: 5.34 K/UL — SIGNIFICANT CHANGE UP (ref 1.8–7.4)
IMM GRANULOCYTES NFR BLD AUTO: 0.3 % — SIGNIFICANT CHANGE UP (ref 0–1.5)
KETONES UR-MCNC: NEGATIVE — SIGNIFICANT CHANGE UP
LEUKOCYTE ESTERASE UR-ACNC: NEGATIVE — SIGNIFICANT CHANGE UP
LYMPHOCYTES # BLD AUTO: 1.59 K/UL — SIGNIFICANT CHANGE UP (ref 1–3.3)
LYMPHOCYTES # BLD AUTO: 20.8 % — SIGNIFICANT CHANGE UP (ref 13–44)
MCHC RBC-ENTMCNC: 25.3 PG — LOW (ref 27–34)
MCHC RBC-ENTMCNC: 32.3 GM/DL — SIGNIFICANT CHANGE UP (ref 32–36)
MCV RBC AUTO: 78.4 FL — LOW (ref 80–100)
MONOCYTES # BLD AUTO: 0.44 K/UL — SIGNIFICANT CHANGE UP (ref 0–0.9)
MONOCYTES NFR BLD AUTO: 5.8 % — SIGNIFICANT CHANGE UP (ref 2–14)
NEUTROPHILS # BLD AUTO: 5.34 K/UL — SIGNIFICANT CHANGE UP (ref 1.8–7.4)
NEUTROPHILS NFR BLD AUTO: 69.9 % — SIGNIFICANT CHANGE UP (ref 43–77)
NITRITE UR-MCNC: NEGATIVE — SIGNIFICANT CHANGE UP
NRBC # BLD: 0 /100 WBCS — SIGNIFICANT CHANGE UP
NRBC # FLD: 0 K/UL — SIGNIFICANT CHANGE UP
PH UR: 6.5 — SIGNIFICANT CHANGE UP (ref 5–8)
PLATELET # BLD AUTO: 275 K/UL — SIGNIFICANT CHANGE UP (ref 150–400)
POTASSIUM SERPL-MCNC: 3.8 MMOL/L — SIGNIFICANT CHANGE UP (ref 3.5–5.3)
POTASSIUM SERPL-SCNC: 3.8 MMOL/L — SIGNIFICANT CHANGE UP (ref 3.5–5.3)
PROT SERPL-MCNC: 6.8 G/DL — SIGNIFICANT CHANGE UP (ref 6–8.3)
PROT UR-MCNC: ABNORMAL
RBC # BLD: 5.1 M/UL — SIGNIFICANT CHANGE UP (ref 4.2–5.8)
RBC # FLD: 13.5 % — SIGNIFICANT CHANGE UP (ref 10.3–14.5)
SODIUM SERPL-SCNC: 139 MMOL/L — SIGNIFICANT CHANGE UP (ref 135–145)
SP GR SPEC: 1.03 — SIGNIFICANT CHANGE UP (ref 1–1.05)
UROBILINOGEN FLD QL: SIGNIFICANT CHANGE UP
WBC # BLD: 7.63 K/UL — SIGNIFICANT CHANGE UP (ref 3.8–10.5)
WBC # FLD AUTO: 7.63 K/UL — SIGNIFICANT CHANGE UP (ref 3.8–10.5)

## 2022-04-08 PROCEDURE — 99284 EMERGENCY DEPT VISIT MOD MDM: CPT

## 2022-04-08 NOTE — PROVIDER CONTACT NOTE (OTHER) - ASSESSMENT
Pt resides at [facility] [phone].  Writer spoke with counselor Luisa, she accepted pt back. Per Luisa pt travels independently via Bus, asked to give metro card. I provided metro card. Luann Lyles also spoke with Luisa and gave all d/c and follow up instructions. Pt walked out to the bus stop. Verbal Huddle completed for this visit.

## 2022-04-08 NOTE — ED PROVIDER NOTE - OBJECTIVE STATEMENT
23 y/o male with history of GERD, hiatal hernia, and schizophrenia (from outpatient [facility]) presenting with dizziness and weakness since last night. Patient states that since last night, he felt generalized weakness, lightheadedness, and dizziness. States everything "feels blurry," even his vision. Denies chest pain, shortness of breath, palpitations, abdomina pain, nausea, vomiting, diarrhea, headache, recent travel, illness, fever, chills, or any other complaints. 23 y/o male with history of GERD, hiatal hernia, and schizophrenia (from outpatient [place]) presenting with dizziness and weakness since last night. Patient states that since last night, he felt generalized weakness, lightheadedness, and dizziness. States everything "feels blurry," does report blurry vision. Denies chest pain, shortness of breath, palpitations, abdomina pain, nausea, vomiting, diarrhea, headache, diplopia, floaters, spots in his vision, ear pain, recent travel, illness, fever, chills, or any other complaints. 21 y/o male with history of GERD, hiatal hernia, and schizophrenia (from outpatient [address]) presenting with dizziness and weakness since last night. Patient states that since last night, he felt generalized weakness, lightheadedness, and dizziness. States everything "feels blurry," does report blurry vision. Denies chest pain, shortness of breath, palpitations, abdomina pain, nausea, vomiting, diarrhea, headache, diplopia, floaters, spots in his vision, ear pain, recent travel, illness, fever, chills, or any other complaints.

## 2022-04-08 NOTE — ED PROVIDER NOTE - NSICDXPASTMEDICALHX_GEN_ALL_CORE_FT
PAST MEDICAL HISTORY:  Asthma       GERD (gastroesophageal reflux disease)     Hiatal hernia     Schizophrenia

## 2022-04-08 NOTE — ED PROVIDER NOTE - NS ED ATTENDING STATEMENT MOD
This was a shared visit with the DANIEL. I reviewed and verified the documentation and independently performed the documented:

## 2022-04-08 NOTE — ED PROVIDER NOTE - ATTENDING CONTRIBUTION TO CARE
The patient is a 22y Male who has a past medical and surgery history of Schizophrenia Asthma GERD HHernia  PTED from [address]) with dizziness weakness, lightheadedness, and blurriness of vision as described without chest pain, shortness of breath, palpitations, abdomina pain, nausea, vomiting, diarrhea, headache, diplopia, floaters, spots in his vision, ear pain, recent travel, illness, fever, chills, or any other complaints.   Vital Signs Stable   PE: as described; my additions and exceptions are noted in the chart; 20/20 vision    DATA:  EKG:   LAB:                       12.9   7.63  )-----------( 275      ( 2022 15:04 )             40.0   Mean Cell Volume: 78.4 fL (22 @ 15:04)  Auto Neutrophil %: 69.9 % (22 @ 15:04)  Auto Eosinophil %: 2.8 % (22 @ 15:04)      139  |  104  |  15  ----------------------------<  78  3.8   |  22  |  1.00    Ca    9.4      2022 15:04    TPro  6.8  /  Alb  4.4  /  TBili  <0.2  /  DBili  x   /  AST  16  /  ALT  19  /  AlkPhos  53  08     Urinalysis Basic - ( 2022 15:30 )    Color: Yellow / Appearance: Clear / S.028 / pH: x  Gluc: x / Ketone: Negative  / Bili: Negative / Urobili: <2 mg/dL   Blood: x / Protein: Trace / Nitrite: Negative   Leuk Esterase: Negative / RBC: 1 /HPF / WBC 1 /HPF   Sq Epi: x / Non Sq Epi: 1 /HPF / Bacteria: Negative       IMPRESSION/RISK:  Dx= Weakness of ? origin    Consideration include No organic dz would suggest a review of new meds/interactions at home facility  Plan  as above The patient is a 22y Male who has a past medical and surgery history of Schizophrenia Asthma GERD HHernia  PTED from [address]) with dizziness weakness, lightheadedness, and blurriness of vision as described without chest pain, shortness of breath, palpitations, abdomina pain, nausea, vomiting, diarrhea, headache, diplopia, floaters, spots in his vision, ear pain, recent travel, illness, fever, chills, or any other complaints.   Vital Signs Stable   PE: as described; my additions and exceptions are noted in the chart; 2020 vision    DATA:  EKG: Pending  LAB:                       12.9   7.63  )-----------( 275      ( 2022 15:04 )             40.0   Mean Cell Volume: 78.4 fL (22 @ 15:04)  Auto Neutrophil %: 69.9 % (22 @ 15:04)  Auto Eosinophil %: 2.8 % (22 @ 15:04)      139  |  104  |  15  ----------------------------<  78  3.8   |  22  |  1.00    Ca    9.4      2022 15:04    TPro  6.8  /  Alb  4.4  /  TBili  <0.2  /  DBili  x   /  AST  16  /  ALT  19  /  AlkPhos  53       Urinalysis Basic - ( 2022 15:30 )    Color: Yellow / Appearance: Clear / S.028 / pH: x  Gluc: x / Ketone: Negative  / Bili: Negative / Urobili: <2 mg/dL   Blood: x / Protein: Trace / Nitrite: Negative   Leuk Esterase: Negative / RBC: 1 /HPF / WBC 1 /HPF   Sq Epi: x / Non Sq Epi: 1 /HPF / Bacteria: Negative       IMPRESSION/RISK:  Dx= Weakness of ? origin    Consideration include No organic dz would suggest a review of new meds/interactions at home facility  Plan  as above

## 2022-04-08 NOTE — ED ADULT NURSE NOTE - OBJECTIVE STATEMENT
Received pt to intake 1, A+Ox4, ambulatory. C/O dizziness, weakness since last night. pt also reports blurry vision. Patient with cranial nerves intact, equal strength bilaterally, sensation equal bilaterally, no facial droop, clear and coherent speech, intact finger to nose, negative pronator drift. Respirations even and unlabored, normal work of breathing, no accessory muscle use, speaking in full clear uninterrupted sentences. Pt denies any chest pain, SOB, headache, N/V/D, fever, chills. . 20G to LAC, Labs sent, will continue to monitor.

## 2022-04-08 NOTE — ED ADULT NURSE NOTE - NSIMPLEMENTINTERV_GEN_ALL_ED
Implemented All Universal Safety Interventions:  [place] to call system. Call bell, personal items and telephone within reach. Instruct patient to call for assistance. Room bathroom lighting operational. Non-slip footwear when patient is off stretcher. Physically safe environment: no spills, clutter or unnecessary equipment. Stretcher in lowest position, wheels locked, appropriate side rails in place.

## 2022-04-08 NOTE — ED PROVIDER NOTE - PROGRESS NOTE DETAILS
HERNAN Lyles: Labs without acute abnormality, pt reports blurry vision has been going on "for a while". SW to assist with transportation back to [facility] [place]. Pt will return with any worsening or concerning symptoms. HERNAN Lyles: Labs without acute abnormality, pt reports blurry vision has been going on "for a while". SW to assist with transportation back to [facility], [address] [place] [address], myself and SW spoke with manager. Pt will return with any worsening or concerning symptoms.

## 2022-04-08 NOTE — ED PROVIDER NOTE - CLINICAL SUMMARY MEDICAL DECISION MAKING FREE TEXT BOX
21 y/o male with history of GERD, asthma, hiatal hernia, and schizophrenia presents with dizziness and weakness since last night. On exam, patient is well appearing, vital signs normal, and has nonfocal neuro exam.  Plan: CBC, CMP, UA, and EKG. 21 y/o male with history of GERD, asthma, hiatal hernia, and schizophrenia presents with dizziness and weakness since last night as well as blurry vision. On exam, patient is well appearing, vital signs normal, and has nonfocal neuro exam, vision 20/20.   Plan: CBC, CMP, UA, and EKG.

## 2022-04-08 NOTE — ED PROVIDER NOTE - PATIENT PORTAL LINK FT
You can access the FollowMyHealth Patient Portal offered by [facility] by registering at the following website: [email or web address]. By joining [employer]’s FollowMyHealth portal, you will also be able to view your health information using other applications (apps) compatible with our system.

## 2022-04-08 NOTE — ED PROVIDER NOTE - NSFOLLOWUPINSTRUCTIONS_ED_ALL_ED_FT
Follow up with your primary care doctor within 1 week  Follow up with an ophthalmologist, you can call the clinic at [phone] to make an appointment, referral list also attached  Return to the ER with any worsening or concerning symptoms, headache, weakness, changes to your vision, double vision or any other concerns.

## 2022-04-09 LAB
CULTURE RESULTS: SIGNIFICANT CHANGE UP
SPECIMEN SOURCE: SIGNIFICANT CHANGE UP

## 2022-05-19 ENCOUNTER — EMERGENCY (EMERGENCY)
Facility: HOSPITAL | Age: 22
LOS: 1 days | Discharge: HOME | End: 2022-05-19
Admitting: EMERGENCY MEDICINE
Payer: MEDICAID

## 2022-05-19 VITALS
TEMPERATURE: 99 F | SYSTOLIC BLOOD PRESSURE: 143 MMHG | DIASTOLIC BLOOD PRESSURE: 98 MMHG | OXYGEN SATURATION: 99 % | RESPIRATION RATE: 16 BRPM | HEART RATE: 102 BPM

## 2022-05-19 PROCEDURE — 99284 EMERGENCY DEPT VISIT MOD MDM: CPT

## 2022-05-19 NOTE — ED PROVIDER NOTE - OBJECTIVE STATEMENT
23 y/o male from [facility] c/o epistaxis x today. Pt states that he had 2 nosebleeds today and felt lightheaded after seeing the blood. Pt denies current bleeding. Denies chest pain, sob, abd pain, n/v/d, numbness, tingling, weakness, dizziness, syncope, fever or chills.

## 2022-05-19 NOTE — PROVIDER CONTACT NOTE (OTHER) - ASSESSMENT
As per team, pt is cleared for discharge at this time. D/c paperwork in hand. Pt ready and able to return to [place] [facility] [address] at [phone]. Writer contacted residence and spoke with staff who was informed of pt's discharge. No concerns reported. Pt reported to travel INDEPENDENTLY via TAXI. Writer arranged [employer] for pt's return to residence with [employer] [facility] #[phone] with invoice #[id number]. Verbal huddle occurred with interdisciplinary team. Pt escorted to taxi.

## 2022-05-19 NOTE — ED PROVIDER NOTE - PATIENT PORTAL LINK FT
You can access the FollowMyHealth Patient Portal offered by [facility] by registering at the following website: [email or web address]. By joining [employer]’s FollowMyHealth portal, you will also be able to view your health information using other applications (apps) compatible with our system.

## 2022-05-19 NOTE — ED PROVIDER NOTE - CLINICAL SUMMARY MEDICAL DECISION MAKING FREE TEXT BOX
23 y/o male w/ epistaxis then felt dizzy, currently no bleeding, denies any active symptoms. stable for dc.

## 2022-05-19 NOTE — ED ADULT TRIAGE NOTE - CHIEF COMPLAINT QUOTE
Pt presents to ED via EMS from [place] [address] with c/o nosebleed and dizziness. Pt reports chest pain. Pt denies headache, numbness, tingling, or other neuro deficits.

## 2022-05-20 PROBLEM — K44.9 DIAPHRAGMATIC HERNIA WITHOUT OBSTRUCTION OR GANGRENE: Chronic | Status: ACTIVE | Noted: 2022-04-08

## 2022-05-20 PROBLEM — K21.9 GASTRO-ESOPHAGEAL REFLUX DISEASE WITHOUT ESOPHAGITIS: Chronic | Status: ACTIVE | Noted: 2022-04-08

## 2022-05-20 PROBLEM — F20.9 SCHIZOPHRENIA, UNSPECIFIED: Chronic | Status: ACTIVE | Noted: 2022-04-08

## 2022-11-02 ENCOUNTER — EMERGENCY (EMERGENCY)
Facility: HOSPITAL | Age: 22
LOS: 1 days | Discharge: HOME | End: 2022-11-02
Attending: EMERGENCY MEDICINE | Admitting: EMERGENCY MEDICINE

## 2022-11-02 VITALS
RESPIRATION RATE: 18 BRPM | DIASTOLIC BLOOD PRESSURE: 93 MMHG | OXYGEN SATURATION: 100 % | HEART RATE: 64 BPM | TEMPERATURE: 98 F | SYSTOLIC BLOOD PRESSURE: 142 MMHG

## 2022-11-02 VITALS
SYSTOLIC BLOOD PRESSURE: 144 MMHG | RESPIRATION RATE: 18 BRPM | OXYGEN SATURATION: 100 % | DIASTOLIC BLOOD PRESSURE: 99 MMHG | TEMPERATURE: 99 F | HEART RATE: 102 BPM

## 2022-11-02 DIAGNOSIS — F79 UNSPECIFIED INTELLECTUAL DISABILITIES: ICD-10-CM

## 2022-11-02 DIAGNOSIS — F20.9 SCHIZOPHRENIA, UNSPECIFIED: ICD-10-CM

## 2022-11-02 PROCEDURE — 90792 PSYCH DIAG EVAL W/MED SRVCS: CPT

## 2022-11-02 PROCEDURE — 99284 EMERGENCY DEPT VISIT MOD MDM: CPT

## 2022-11-02 NOTE — ED PROVIDER NOTE - DATE/TIME 3
Duration Of Freeze Thaw-Cycle (Seconds): 10
Number Of Freeze-Thaw Cycles: 1 freeze-thaw cycle
Render Note In Bullet Format When Appropriate: No
Post-Care Instructions: I reviewed with the patient in detail post-care instructions. Patient is to wear sunprotection, and avoid picking at any of the treated lesions. Pt may apply Vaseline to crusted or scabbing areas.
Detail Level: Simple
Consent: The patient's consent was obtained including but not limited to risks of crusting, scabbing, blistering, scarring, darker or lighter pigmentary change, recurrence, incomplete removal and infection.
02-Nov-2022 06:12

## 2022-11-02 NOTE — ED BEHAVIORAL HEALTH ASSESSMENT NOTE - OTHER PAST PSYCHIATRIC HISTORY (INCLUDE DETAILS REGARDING ONSET, COURSE OF ILLNESS, INPATIENT/OUTPATIENT TREATMENT)
He says he was dxd with schizophrenia  as a little kid, says that he has never had any SI, SA or SIB but then says that he was hospitalized at [facility] of SI after saying that he was suicidal.    Per collateral he has intellectual disability and today was crying all day, said he was suicidal and asked to go to the hospital.

## 2022-11-02 NOTE — ED PROVIDER NOTE - CLINICAL SUMMARY MEDICAL DECISION MAKING FREE TEXT BOX
schizophrenia/ intellectual disability - here for depression. Per SW collateral, patient expressed SI. He denies SI/HI now. Plan for psych evaluation.

## 2022-11-02 NOTE — ED BEHAVIORAL HEALTH ASSESSMENT NOTE - DESCRIPTION
Vital Signs Last 24 Hrs  T(C): 37 (02 Nov 2022 00:01), Max: 37 (02 Nov 2022 00:01)  T(F): 98.6 (02 Nov 2022 00:01), Max: 98.6 (02 Nov 2022 00:01)  HR: 102 (02 Nov 2022 00:01) (102 - 102)  BP: 144/99 (02 Nov 2022 00:01) (144/99 - 144/99)  BP(mean): --  RR: 18 (02 Nov 2022 00:01) (18 - 18)  SpO2: 100% (02 Nov 2022 00:01) (100% - 100%)    Parameters below as of 02 Nov 2022 00:01  Patient On (Oxygen Delivery Method): room air Says he was raised by his mother in [facility], states he does not recall if he had sibs then says that he has 3 brothers and 3 sisters..  He has a HS diploma attended special ed and attends a day program at [employer]. pt endorses asthma and borderline diabetes

## 2022-11-02 NOTE — ED BEHAVIORAL HEALTH NOTE - BEHAVIORAL HEALTH NOTE
COVID Exposure Screen- Patient    1. *Have you had a COVID-19 test in the last 90 days? ( ) Yes (x ) No ( ) Unknown- Reason: _____    IF YES PROCEED TO QUESTION #2. IF NO OR UNKNOWN, PLEASE SKIP TO QUESTION #3.    2. Date of test(s) and result(s): ________    3. *Have you tested positive for COVID-19 antibodies? ( ) Yes (x ) No ( ) Unknown- Reason: _____    IF YES PROCEED TO QUESTION #4. IF NO or UNKNOWN, PLEASE SKIP TO QUESTION #5.    4. Date of positive antibody test: ________    5. *Have you received 2 doses of the COVID-19 vaccine? (x ) Yes ( ) No ( ) Unknown- Reason: _____    IF YES PROCEED TO QUESTION #6. IF NO or UNKNOWN, PLEASE SKIP TO QUESTION #7.    6. Date of second dose: Moderna dose 1 and 2;  21-Oct-2021    7. *In the past 10 days, have you been around anyone with a positive COVID-19 test?* ( ) Yes (x ) No ( ) Unknown- Reason: ____    IF YES PROCEED TO QUESTION #8. IF NO or UNKNOWN, PLEASE SKIP TO QUESTION #13.    8. Were you within 6 feet of them for at least 15 minutes? ( ) Yes ( ) No ( ) Unknown- Reason: _____    9. Have you provided care for them? ( ) Yes ( ) No ( ) Unknown- Reason: ______    10. Have you had direct physical contact with them (touched, hugged, or kissed them)? ( ) Yes ( ) No ( ) Unknown- Reason: _____    11. Have you shared eating or drinking utensils with them? ( ) Yes ( ) No ( ) Unknown- Reason: ____    12. Have they sneezed, coughed, or somehow gotten respiratory droplets on you? ( ) Yes ( ) No ( ) Unknown- Reason: ______    13. *Have you been out of [place] within the past 10 days?* ( ) Yes (x ) No ( ) Unknown- Reason: _____    IF YES PLEASE ANSWER THE FOLLOWING QUESTIONS:    14. Which state/country have you been to? ______    15. Were you there over 24 hours? ( ) Yes ( ) No ( ) Unknown- Reason: ______    16. Date of return to [facility]: ______

## 2022-11-02 NOTE — ED ADULT NURSE REASSESSMENT NOTE - NS ED NURSE REASSESS COMMENT FT1
Pt awakened, a&ox3, calm and cooperative. Denies current S/I H/I I/P. MC for dc to [place] residence via  cab provided.

## 2022-11-02 NOTE — ED ADULT NURSE REASSESSMENT NOTE - NS ED NURSE REASSESS COMMENT FT1
break coverage RN- Pt sleeping in  room4 .  Respirations even and unlabored.  VSS. Pt NAD. Will continue to monitor.

## 2022-11-02 NOTE — ED PROVIDER NOTE - PATIENT PORTAL LINK FT
You can access the FollowMyHealth Patient Portal offered by [facility] by registering at the following website: [email or web address]. By joining [employer]’s FollowMyHealth portal, you will also be able to view your health information using other applications (apps) compatible with our system.

## 2022-11-02 NOTE — ED BEHAVIORAL HEALTH ASSESSMENT NOTE - CURRENT MEDICATION
Pt endorses taking medication but says he does not know  the name of his medication and his residence was not available, went to [facility] at the time of this consult.

## 2022-11-02 NOTE — ED PROVIDER NOTE - NSFOLLOWUPINSTRUCTIONS_ED_ALL_ED_FT
You were seen here today for evaluation of depression. Psychiatry saw and evaluated you. Please follow up with your psychiatrist/ counselor. Continue taking your medications as prescribed. If you feel worse or have thoughts of harming yourself, return to the emergency department.

## 2022-11-02 NOTE — ED BEHAVIORAL HEALTH NOTE - BEHAVIORAL HEALTH NOTE
BRIANA contacted pt's residence, [facility], at [phone] for collateral information.  BRIANA spoke with staff member Naseem.  As per Naseem, pt came to his office asking for help.  He states that pt informed him that he felt depressed and suicidal; states that he was crying all day, though he had not disclosed this to anyone earlier in the day.  As per Naseem, pt normally keeps to himself and is very docile.  He states that pt normally does not request help, but today after speaking with Naseem had asked to be sent to the hospital.  As per Naseem, pt is compliant with his medication and is generally compliant with his care.  He reports that pt did not disclose any plan in regards to feeling suicidal.

## 2022-11-02 NOTE — ED PROVIDER NOTE - PRINCIPAL DIAGNOSIS
Intellectual disability Cheiloplasty (Complex) Text: A decision was made to reconstruct the defect with a  cheiloplasty.  The defect was undermined extensively.  Additional obicularis oris muscle was excised with a 15 blade scalpel.  The defect was converted into a full thickness wedge to facilite a better cosmetic result.  Small vessels were then tied off with 5-0 monocyrl. The obicularis oris, superficial fascia, adipose and dermis were then reapproximated.  After the deeper layers were approximated the epidermis was reapproximated with particular care given to realign the vermilion border.

## 2022-11-02 NOTE — ED PROVIDER NOTE - OBJECTIVE STATEMENT
Milka CHAVEZ: Patient Milka CHAVEZ: Patient is a 23 yo M with history of schizophrenia, intellectual disability, hx of asthma, GERD here for psych evaluation. Patient states he feels sad and depressed. He states he feels like crying and he needs to be admitted. Denies suicidal ideations, homicidal ideations, hallucinations. Denies any pain. Per collateral obtained by BRIANA, [facility] staff indicated that patient was in his room crying all day, came out to the desk to ask for help, expressed feeling depressed and suicidal. Patient is unable to further describe his sadness. Sister: Linda at [phone]. I spoke to her and she states in the past patient has felt sad before a psychotic break. She spoke to patient, agrees that at this time he is denying everything.

## 2022-11-02 NOTE — ED PROVIDER NOTE - PROGRESS NOTE DETAILS
Psych paged. Milka CHAVEZ: Patient seen by psych attending. Dr. Hawthorne. Cleared for discharge back to [place]. Milka CHAVEZ: SW to arrange for transport back to [place].

## 2022-11-02 NOTE — ED PROVIDER NOTE - IV ALTEPLASE EXCL ABS HIDDEN
July 20, 2021      Re:   Sandra Ramos  [address] [facility] [address]         YOB: 1991       PRESCRIPTION - LETTER/CERTIFICATE OF MEDICAL NECESSITY  ________________________________________________________________    Prescribing physician:  Asaf Jerome DPM    DIAGNOSIS:  Encounter Diagnoses   Name Primary?   • Left foot pain Yes   • Bursitis of left foot        Description of item/services ordered:   Side: Bilateral    1 pair custom molded functional foot orthotics    _______________________________________________________________  Prognosis: Good    Expected length of need: 99    Medical necessity:  Left foot pain and bursitis, instability of foot joint b/l  _______________________________________________________________     I authorized the item/services shown above and certify that the information provided herein is true and accurate.    Asaf Jerome DPM  [address] [facility] [address]  [facility] [address]   show

## 2022-11-02 NOTE — ED BEHAVIORAL HEALTH ASSESSMENT NOTE - HPI (INCLUDE ILLNESS QUALITY, SEVERITY, DURATION, TIMING, CONTEXT, MODIFYING FACTORS, ASSOCIATED SIGNS AND SYMPTOMS)
23 y/o male presents to ED reports that he was feeling depressed, crying, just felt this way when he woke up, that he felt this way just today and still feels depressed.  He explains that he just wanted to talk to someone but couldn't.  He says that this never happened before.  He denies any SI and says that he has never had any SI.  He says that he lives at [facility], denies feeling that anyone wants to harm him, denies any AH/VH.  He says that it is his second year at [employer] after living in [place] with his sister.    He says that he has PMHx of Asthma and borderline diabetes.  He says he was dxd with schizophrenia  as a little kid, says that he has never had any SI, SA or SIB but then says that he was hospitalized at [facility] of SI after saying that he was suicidal.    Per collateral he has intellectual disability and today was crying all day, said he was suicidal and asked to go to the hospital. 23 y/o male presents to ED reports that he was feeling depressed, crying, just felt this way when he woke up, that he felt this way just today and still feels depressed.  He explains that he just wanted to talk to someone but couldn't.  He says that this never happened before.  He denies any SI and says that he has never had any SI.  He says that he lives at [facility], denies feeling that anyone wants to harm him, denies any AH/VH.  He says that it is his second year at [employer] after living in [place] with his sister.    He says that he has PMHx of Asthma and borderline diabetes.  He says he was dxd with schizophrenia  as a little kid, says that he has never had any SI, SA or SIB but then says that he was hospitalized at [facility] bc of SI after saying that he was suicidal.    Per collateral he has intellectual disability and today was crying all day, said he was suicidal and asked to go to the hospital.    BRIANA contacted pt's residence, [facility], at [phone] for collateral information.  BRIANA spoke with staff member Naseem.  As per Naseem, pt came to his office asking for help.  He states that pt informed him that he felt depressed and suicidal; states that he was crying all day, though he had not disclosed this to anyone earlier in the day.  As per Naseem, pt normally keeps to himself and is very docile.  He states that pt normally does not request help, but today after speaking with Naseem had asked to be sent to the hospital.  As per Naseem, pt is compliant with his medication and is generally compliant with his care.  He reports that pt did not disclose any plan in regards to feeling suicidal.

## 2022-11-02 NOTE — ED BEHAVIORAL HEALTH ASSESSMENT NOTE - DETAILS
just today before he came to hospital per collateral, he denies called voicemail father whom he never knew had schizoophrenia see safety plan

## 2022-11-02 NOTE — ED BEHAVIORAL HEALTH NOTE - BEHAVIORAL HEALTH NOTE
SW informed that pt is being discharged, as per provider Dr. Jarquin.  SW called residence, spoke with Naseem, who reports that pt is independent in travel and can take a taxi.  Pt is returning to [facility].  Discussed with provider, who is in agreement with pt traveling via taxi.  BRIANA ordered [employer] taxi for pt travel.  Verbal huddle complete.

## 2022-11-02 NOTE — ED ADULT NURSE NOTE - OBJECTIVE STATEMENT
BIB ems from [facility] for worsening depression since this morning d/t "missing my family" Denies current SI, HI, AVH paranoia or IOR. PMH schizophrenia, reports compliant with current psychotropic regimen.  Denies illicit drug/etoh use.

## 2022-11-02 NOTE — ED ADULT TRIAGE NOTE - CHIEF COMPLAINT QUOTE
worsening depression since this morning. PT is from [facility] and denies any SI, AH, VH. PMH schizophrenia

## 2022-11-02 NOTE — ED ADULT NURSE NOTE - CHIEF COMPLAINT QUOTE
worsening depression since this morning. PT is from [facility] and denies any SI, AH, VH. PMH schizophrenia

## 2022-11-02 NOTE — ED BEHAVIORAL HEALTH ASSESSMENT NOTE - SUMMARY
21 y/o male with Intellectual Disability, dx of schizophrenia presented to ED from [facility] after he today reported feeling depressed and suicidal and asked for help.  He reports now that he was not suicidal and the depression started just today when he woke up.  He on exam was calm, cooperative said that he just wanted to talk to someone, never wanted to harm himself and says that he does not want to stay in the hospital, just wanted to talk to someone here and he now just wants to talk to his counselor back at his program.

## 2023-01-30 ENCOUNTER — EMERGENCY (EMERGENCY)
Facility: HOSPITAL | Age: 23
LOS: 1 days | Discharge: HOME | End: 2023-01-30
Admitting: EMERGENCY MEDICINE
Payer: MEDICAID

## 2023-01-30 VITALS
HEART RATE: 96 BPM | SYSTOLIC BLOOD PRESSURE: 133 MMHG | RESPIRATION RATE: 16 BRPM | DIASTOLIC BLOOD PRESSURE: 86 MMHG | TEMPERATURE: 99 F | OXYGEN SATURATION: 100 %

## 2023-01-30 PROCEDURE — 99284 EMERGENCY DEPT VISIT MOD MDM: CPT

## 2023-01-30 NOTE — ED PROVIDER NOTE - PATIENT PORTAL LINK FT
You can access the FollowMyHealth Patient Portal offered by [facility] by registering at the following website: [email or web address]. By joining [employer]’s FollowMyHealth portal, you will also be able to view your health information using other applications (apps) compatible with our system.

## 2023-01-30 NOTE — ED PROVIDER NOTE - OBJECTIVE STATEMENT
This is a 22-year-old male past medical history, intellectual disability, htn, hyperlipidemia, schizophrenia with complaint of visual hallucination. Arrived from [facility]  [address]. Reports he was bothered by visual hallucination  sees photographs and they are bothersome although it does not make him to act out, hurt himself or others. Reports he talked with his psychiatrist regarding his symptoms, increased visual hallucination but at this time he did not change his medication. Reports compliance with outpatient treatments. Endorses recent cold symptoms, feels kind a tired and less appetite. Denies suicidal homicidal out auditory hallucinations.  Denies fever, chills, sob, headache, dizziness, chest pain, abd pain, n, v or changes in bladder and bowel habits.

## 2023-01-30 NOTE — ED ADULT NURSE NOTE - OBJECTIVE STATEMENT
Pt brought to  by [facility]. Pt is a&ox4, calm and cooperative, positive eye contact, ambulatory at baseline. Pt told staff members at facility he was seeing photographs. Pmhx schizophrenia. Pt denies SI/HI, auditory hallucinations. NP Bereczky at bedside for further evaluation. Belongings obtain and placed in locker.

## 2023-01-30 NOTE — ED PROVIDER NOTE - CLINICAL SUMMARY MEDICAL DECISION MAKING FREE TEXT BOX
This is a 22-year-old male past medical history, intellectual disability, htn, hyperlipidemia, schizophrenia with complaint of visual hallucination. Arrived from [facility] bl[facility]  74 [address]. Reports he was bothered by visual hallucination  sees photographs and they are bothersome although it does not make him to act out, hurt himself or others. Reports he talked with his psychiatrist regarding his symptoms, increased visual hallucination but at this time he did not change his medication. Reports compliance with outpatient treatments. Endorses recent cold symptoms, feels kind a tired and less appetite. Denies suicidal homicidal out auditory hallucinations.  Denies fever, chills, sob, headache, dizziness, chest pain, abd pain, n, v or changes in bladder and bowel habits.  Collateral info by nathanael from [facility], refer to the note. There is no clinical evidence of intoxication, or any acute medical problem requiring immediate intervention.  At present time he is not a harm to self or others and can be safely discharge to follow up with psychiatrist.

## 2023-01-30 NOTE — ED ADULT TRIAGE NOTE - CHIEF COMPLAINT QUOTE
Pt h/o schizophrenia, compliant with medication, endorsing visual hallucinations, denies auditory hallucinations, denies SI/HI, calm and cooperative.

## 2023-01-30 NOTE — ED PROVIDER NOTE - PROGRESS NOTE DETAILS
NP Bereczky- Patient remains calm, cooperative, and in good behavioral control throughout ED course. He has not required prn meds. Pt states he feels fine and denies any psychiatric complaints like visual hallucination at this time. He is organized and does not appear internally preoccupied or paranoid. He denies suicidal ideation, intent, or plan. He denies homicidal or violent ideation, intent, or plan. He denies paranoia, no delusional content, no manic symptoms, paranoia  or depression at this time.

## 2023-01-30 NOTE — ED PROVIDER NOTE - NSFOLLOWUPINSTRUCTIONS_ED_ALL_ED_FT
Follow up with your primary care physician and psychiatrist in 48-72 hours.  You may also see the psychiatrist at [facility]:    [address] [place] [address]  Phone: [phone]    [facility] on [facility] Information:    -Walk-in hours: Monday to Friday, 9am to 3pm   -Almost all walk-in patients will be able to see a psych prescriber the same day   -Scheduled, non-urgent, evening remote/virtual appointments are available on a limited basis. Call our  to inquire about these: [phone]. A crisis center clinician screens these requests in the late afternoon and if appropriate it takes a few days to set-up.   -Visits take about 2 to 4 hours total   -Mornings are the best time for patients to arrive    For Telehealth options try:  Updox: [email or web address] SP Materials (to access psychiatrist or therapist)  AmShopogoliq: Editlite (to access psychiatrist or therapist)  Better Help: betterhelp.com (Largest online therapy group)      SEEK IMMEDIATE MEDICAL CARE IF YOU HAVE ANY OF THE FOLLOWING SYMPTOMS: thoughts about hurting or killing yourself, thoughts about hurting or killing somebody else, hallucinations or any other worsening or persistent symptoms OR ANY NEW OR CONCERNING SYMPTOMS.

## 2023-01-30 NOTE — ED BEHAVIORAL HEALTH NOTE - BEHAVIORAL HEALTH NOTE
[facility] [address]  [phone] to obtain collateral information. Writer spoke w/ Aroldo randhawa who provided the following information.    Patient is a 23 Yo male domiciled at [facility], hx of schizophrenia ,  bib EMS as per request of the patient. Patient reported not feeling well mentally and asked to come to the hospital.  Patient did not endorse any Si, HI or AVH. Aroldo reports that patient did not elaborate about what is wrong. He reports patient is sleeping, eating and showering at baseline. Aroldo reports patient has been compliant w/ medication and treatment. At baseline, patient is calm and quiet. He did not report any drug or alcohol use. He reports patient was sent w/ face sheet which includes diagnosis, medical problems and medication list. He reports patient is covid vaccinated and has no recent travel or exposure. He reports no further safety concerns and that patient can return via taxi if cleared for discharge. [facility] [address]  [phone] to obtain collateral information. Writer spoke w/ Aroldo randhawa who provided the following information.    Patient is a 21 Yo male domiciled at [facility], hx of schizophrenia ,  bib EMS as per request of the patient. Patient reported not feeling well mentally and asked to come to the hospital.  Patient did not endorse any Si, HI or AVH. Aroldo reports that patient did not elaborate about what is wrong. He reports patient is sleeping, eating and showering at baseline. Aroldo reports patient has been compliant w/ medication and treatment. At baseline, patient is calm and quiet. He did not report any drug or alcohol use. He reports patient was sent w/ face sheet which includes diagnosis, medical problems and medication list. He reports patient is covid vaccinated and has no recent travel or exposure. He reports no further safety concerns and that patient can return via taxi if cleared for discharge.    Writer scheduled taxi via [facility] (Inv# [id number]) w/ [employer] [phone]. ride scheduled for 7:15PM. [facility] of organization [address]  [phone] to obtain collateral information. Writer spoke w/ Aroldo randhawa who provided the following information.    Patient is a 21 Yo male domiciled at [facility], hx of schizophrenia ,  bib EMS as per request of the patient. Patient reported not feeling well mentally and asked to come to the hospital.  Patient did not endorse any Si, HI or AVH. Aroldo reports that patient did not elaborate about what is wrong. He reports patient is sleeping, eating and showering at baseline. Aroldo reports patient has been compliant w/ medication and treatment. At baseline, patient is calm and quiet. He did not report any drug or alcohol use. He reports patient was sent w/ face sheet which includes diagnosis, medical problems and medication list. He reports patient is covid vaccinated and has no recent travel or exposure. He reports no further safety concerns and that patient can return via taxi if cleared for discharge.    Per provider, AMISH james, patient is cleared and is able to return to their previous residence, [facility] [address].  has spoken to [facility] staff abby ([phone]),  confirmed that patients mode of transportation is TAXI and that patient travels INDEPENDENTLY. Clinical provider is in agreement with TAXI back to group home. Verbal huddle regarding coordination of care completed with interdisciplinary team.     Writer scheduled taxi via [employer] (Inv# [id number]) w/ [employer] [phone]. ride scheduled for 7:15PM.

## 2023-02-15 ENCOUNTER — EMERGENCY (EMERGENCY)
Facility: HOSPITAL | Age: 23
LOS: 1 days | Discharge: HOME | End: 2023-02-15
Attending: EMERGENCY MEDICINE | Admitting: EMERGENCY MEDICINE
Payer: MEDICAID

## 2023-02-15 VITALS
OXYGEN SATURATION: 100 % | RESPIRATION RATE: 16 BRPM | TEMPERATURE: 98 F | HEART RATE: 108 BPM | SYSTOLIC BLOOD PRESSURE: 148 MMHG | DIASTOLIC BLOOD PRESSURE: 99 MMHG

## 2023-02-15 PROCEDURE — 99284 EMERGENCY DEPT VISIT MOD MDM: CPT

## 2023-02-15 RX ORDER — HALOPERIDOL DECANOATE 100 MG/ML
5 INJECTION INTRAMUSCULAR ONCE
Refills: 0 | Status: COMPLETED | OUTPATIENT
Start: 2023-02-15 | End: 2023-02-15

## 2023-02-15 NOTE — ED BEHAVIORAL HEALTH NOTE - BEHAVIORAL HEALTH NOTE
Called [facility] [phone] for collateral information.  Spoke with staff member, Naseem.  Naseem reports that pt had already left for the hospital when he came on shift, but he was told that pt was sent to the ER due to hearing voices.  States there is no one available that can speak to his mental status when he left.  As per Naseem, pt is typically very compliant with his medications.  He also states that he is mild at baseline, he is not aggressive, he gets along with staff and residents, and is often responsive to staff directives.  He denies any drug or alcohol use.

## 2023-02-15 NOTE — ED ADULT TRIAGE NOTE - CHIEF COMPLAINT QUOTE
Pt from [facility] c/o not sleeping well x 2 months. Pt has pmh of schizophrenia No complaints of chest pain, headache, nausea, dizziness, vomiting  SOB, fever, chills verbalized.. Pt denies SI/HI.  Pt denies visual or auditory hallucinations or other complaints.

## 2023-02-15 NOTE — ED PROVIDER NOTE - NSFOLLOWUPINSTRUCTIONS_ED_ALL_ED_FT
Follow up with your primary care physician and psychiatrist in 48-72 hours.  You may also see the psychiatrist at [facility]:    [address] [place] [address]  Phone: [phone]    [facility] on [facility] Information:    -Walk-in hours: Monday to Friday, 9am to 3pm   -Almost all walk-in patients will be able to see a psych prescriber the same day   -Scheduled, non-urgent, evening remote/virtual appointments are available on a limited basis. Call our  to inquire about these: [phone]. A crisis center clinician screens these requests in the late afternoon and if appropriate it takes a few days to set-up.   -Visits take about 2 to 4 hours total   -Mornings are the best time for patients to arrive    For Telehealth options try:  AudioEye: [email or web address] (to access psychiatrist or therapist)  AmComAbility: [employer] (to access psychiatrist or therapist)  Better Help: betterhelp.com (Largest online therapy group)      SEEK IMMEDIATE MEDICAL CARE IF YOU HAVE ANY OF THE FOLLOWING SYMPTOMS: thoughts about hurting or killing yourself, thoughts about hurting or killing somebody else, hallucinations or any other worsening or persistent symptoms OR ANY NEW OR CONCERNING SYMPTOMS.

## 2023-02-15 NOTE — ED PROVIDER NOTE - PATIENT PORTAL LINK FT
You can access the FollowMyHealth Patient Portal offered by [facility] by registering at the following website: [email or web address]. By joining [employer]’s FollowMyHealth portal, you will also be able to view your health information using other applications (apps) compatible with our system.

## 2023-02-15 NOTE — ED PROVIDER NOTE - OBJECTIVE STATEMENT
This is a 22-year-old male past medical history schizophrenia with complaint of insomnia for the last couple of months.  Patient arrived with [facility] [address] due to anxiety he wants a medication change to sleep better.  Denies suicidal, homicidal, auditory, visual hallucinations, denies psychosis awais depression at this time.

## 2023-02-16 RX ADMIN — HALOPERIDOL DECANOATE 5 MILLIGRAM(S): 100 INJECTION INTRAMUSCULAR at 00:30

## 2023-02-16 NOTE — ED ADULT NURSE NOTE - CHIEF COMPLAINT QUOTE
Pt from [facility] c/o not sleeping well x 2 months. Pt has pmh of schizophrenia No complaints of chest pain, headache, nausea, dizziness, vomiting  SOB, fever, chills verbalized.. Pt denies SI/HI.  Pt denies visual or auditory hallucinations or other complaints.

## 2023-02-16 NOTE — ED BEHAVIORAL HEALTH NOTE - BEHAVIORAL HEALTH NOTE
Notified by provider NP that pt is ready for discharge and needs assistance getting home.  BRIANA confirmed with Naseem at his residence that pt is independent in travel and can return to the residence via taxi.  Discussed with NP, who is aware and in agreement with travel via taxi.  BRIANA arranged [employer] for pt travel back to residence.  Verbal huddle complete.

## 2023-02-16 NOTE — ED ADULT NURSE NOTE - OBJECTIVE STATEMENT
alert oriented no distress is calm and cooperative medicated as ordered  states he is here for insomnia and has been hallucinating

## 2023-04-09 ENCOUNTER — EMERGENCY (EMERGENCY)
Facility: HOSPITAL | Age: 23
LOS: 1 days | Discharge: HOME | End: 2023-04-09
Attending: EMERGENCY MEDICINE | Admitting: EMERGENCY MEDICINE
Payer: MEDICAID

## 2023-04-09 VITALS
HEART RATE: 104 BPM | DIASTOLIC BLOOD PRESSURE: 80 MMHG | SYSTOLIC BLOOD PRESSURE: 142 MMHG | RESPIRATION RATE: 15 BRPM | TEMPERATURE: 97 F

## 2023-04-09 PROCEDURE — 99284 EMERGENCY DEPT VISIT MOD MDM: CPT

## 2023-04-09 NOTE — ED ADULT TRIAGE NOTE - CHIEF COMPLAINT QUOTE
alert from [facility] building [address] c/o epistaxis started 2100 not bleeding at present  PMHx HTN asthma schizophrenia

## 2023-04-10 VITALS
DIASTOLIC BLOOD PRESSURE: 85 MMHG | TEMPERATURE: 98 F | OXYGEN SATURATION: 100 % | RESPIRATION RATE: 16 BRPM | HEART RATE: 76 BPM | SYSTOLIC BLOOD PRESSURE: 125 MMHG

## 2023-04-10 LAB
ALBUMIN SERPL ELPH-MCNC: 4.3 G/DL — SIGNIFICANT CHANGE UP (ref 3.3–5)
ALP SERPL-CCNC: 46 U/L — SIGNIFICANT CHANGE UP (ref 40–120)
ALT FLD-CCNC: 16 U/L — SIGNIFICANT CHANGE UP (ref 4–41)
ANION GAP SERPL CALC-SCNC: 13 MMOL/L — SIGNIFICANT CHANGE UP (ref 7–14)
APTT BLD: 29 SEC — SIGNIFICANT CHANGE UP (ref 27–36.3)
AST SERPL-CCNC: 16 U/L — SIGNIFICANT CHANGE UP (ref 4–40)
BASOPHILS # BLD AUTO: 0.05 K/UL — SIGNIFICANT CHANGE UP (ref 0–0.2)
BASOPHILS NFR BLD AUTO: 0.7 % — SIGNIFICANT CHANGE UP (ref 0–2)
BILIRUB SERPL-MCNC: 0.2 MG/DL — SIGNIFICANT CHANGE UP (ref 0.2–1.2)
BUN SERPL-MCNC: 16 MG/DL — SIGNIFICANT CHANGE UP (ref 7–23)
CALCIUM SERPL-MCNC: 9.4 MG/DL — SIGNIFICANT CHANGE UP (ref 8.4–10.5)
CHLORIDE SERPL-SCNC: 106 MMOL/L — SIGNIFICANT CHANGE UP (ref 98–107)
CO2 SERPL-SCNC: 21 MMOL/L — LOW (ref 22–31)
CREAT SERPL-MCNC: 0.92 MG/DL — SIGNIFICANT CHANGE UP (ref 0.5–1.3)
EGFR: 118 ML/MIN/1.73M2 — SIGNIFICANT CHANGE UP
EOSINOPHIL # BLD AUTO: 0.08 K/UL — SIGNIFICANT CHANGE UP (ref 0–0.5)
EOSINOPHIL NFR BLD AUTO: 1.1 % — SIGNIFICANT CHANGE UP (ref 0–6)
GLUCOSE SERPL-MCNC: 113 MG/DL — HIGH (ref 70–99)
HCT VFR BLD CALC: 42.4 % — SIGNIFICANT CHANGE UP (ref 39–50)
HGB BLD-MCNC: 13.6 G/DL — SIGNIFICANT CHANGE UP (ref 13–17)
IANC: 4.65 K/UL — SIGNIFICANT CHANGE UP (ref 1.8–7.4)
IMM GRANULOCYTES NFR BLD AUTO: 0.3 % — SIGNIFICANT CHANGE UP (ref 0–0.9)
INR BLD: 1.11 RATIO — SIGNIFICANT CHANGE UP (ref 0.88–1.16)
LYMPHOCYTES # BLD AUTO: 1.79 K/UL — SIGNIFICANT CHANGE UP (ref 1–3.3)
LYMPHOCYTES # BLD AUTO: 25.6 % — SIGNIFICANT CHANGE UP (ref 13–44)
MCHC RBC-ENTMCNC: 25.3 PG — LOW (ref 27–34)
MCHC RBC-ENTMCNC: 32.1 GM/DL — SIGNIFICANT CHANGE UP (ref 32–36)
MCV RBC AUTO: 78.8 FL — LOW (ref 80–100)
MONOCYTES # BLD AUTO: 0.4 K/UL — SIGNIFICANT CHANGE UP (ref 0–0.9)
MONOCYTES NFR BLD AUTO: 5.7 % — SIGNIFICANT CHANGE UP (ref 2–14)
NEUTROPHILS # BLD AUTO: 4.65 K/UL — SIGNIFICANT CHANGE UP (ref 1.8–7.4)
NEUTROPHILS NFR BLD AUTO: 66.6 % — SIGNIFICANT CHANGE UP (ref 43–77)
NRBC # BLD: 0 /100 WBCS — SIGNIFICANT CHANGE UP (ref 0–0)
NRBC # FLD: 0 K/UL — SIGNIFICANT CHANGE UP (ref 0–0)
PLATELET # BLD AUTO: 283 K/UL — SIGNIFICANT CHANGE UP (ref 150–400)
POTASSIUM SERPL-MCNC: 4 MMOL/L — SIGNIFICANT CHANGE UP (ref 3.5–5.3)
POTASSIUM SERPL-SCNC: 4 MMOL/L — SIGNIFICANT CHANGE UP (ref 3.5–5.3)
PROT SERPL-MCNC: 7.1 G/DL — SIGNIFICANT CHANGE UP (ref 6–8.3)
PROTHROM AB SERPL-ACNC: 12.9 SEC — SIGNIFICANT CHANGE UP (ref 10.5–13.4)
RBC # BLD: 5.38 M/UL — SIGNIFICANT CHANGE UP (ref 4.2–5.8)
RBC # FLD: 13.4 % — SIGNIFICANT CHANGE UP (ref 10.3–14.5)
SODIUM SERPL-SCNC: 140 MMOL/L — SIGNIFICANT CHANGE UP (ref 135–145)
WBC # BLD: 6.99 K/UL — SIGNIFICANT CHANGE UP (ref 3.8–10.5)
WBC # FLD AUTO: 6.99 K/UL — SIGNIFICANT CHANGE UP (ref 3.8–10.5)

## 2023-04-10 NOTE — ED ADULT NURSE NOTE - OBJECTIVE STATEMENT
Pt received on stretcher, no active nose bleed, pt has bucket with some bloody gauze. Pt denies any pain, pt is from [place], A&Ox4, no labored breathing, pt is calm at this time.

## 2023-04-10 NOTE — ED PROVIDER NOTE - ATTENDING CONTRIBUTION TO CARE
23-year-old male from [facility] with history of schizophrenia, asthma, hypertension, presenting with atraumatic left nare epistaxis that occurred at home for 1 to 1-1/2 hours.  Says he felt some dizziness during bleeding, but no syncope, no shortness of breath, no falls.  States he has had epistaxis in the past which self resolved.  No nose picking, or new medications.    Of note, after arrival to emergency room bleeding stopped    Exam  Dried blood in left nare, no active bleeding  Awake alert talking    Assessment/plan  Epistaxis, likely anterior  Patient overall low risk however due to unclear etiology we will get screening labs with coagulation studies  If no acute abnormalities plan to discharge home with supportive care  Afrin given at bedside for local vasoconstriction

## 2023-04-10 NOTE — ED PROVIDER NOTE - NSFOLLOWUPINSTRUCTIONS_ED_ALL_ED_FT
You were seen in the emergency department for nose bleed. Your workup in the emergency department includes bloodwork. The presumed diagnosis is anterior nose bleed. You can find the results of all the tests in this discharge packet. Please follow up with your primary care doctor within 48 hours for continuation of care.     Return to the emergency department if you experience any new/concerning/worsening symptoms such as but not limited to: fever (>100.3F), intractable nausea, vomiting, continuous nose bleed even after applying pressure.

## 2023-04-10 NOTE — ED ADULT NURSE NOTE - CHIEF COMPLAINT QUOTE
alert from [place] [address] c/o epistaxis started 2100 not bleeding at present  PMHx HTN asthma schizophrenia

## 2023-04-10 NOTE — ED ADULT NURSE NOTE - NSIMPLEMENTINTERV_GEN_ALL_ED
Implemented All Universal Safety Interventions:  [place] to call system. Call bell, personal items and telephone within reach. Instruct patient to call for assistance. Room bathroom lighting operational. Non-slip footwear when patient is off stretcher. Physically safe environment: no spills, clutter or unnecessary equipment. Stretcher in lowest position, wheels locked, appropriate side rails in place.

## 2023-04-10 NOTE — ED PROVIDER NOTE - OBJECTIVE STATEMENT
Patient is a 22-year-old male with history of schizophrenia, asthma and GERD brought in by EMS from [facility] for complaints of epistaxis.  Patient reports that when he was going to sleep around 9 PM, he noticed some bleeding in his left nose, also feels like there is blood going down his throat.  He called EMS and was thus brought to the emergency department.  Reports that EMS put gauze in his left nare to stop the bleeding and he felt that the bleeding has stopped, and does not feel like he has blood in the back of his throat.  Denies picking his nose, denies facial trauma.  Denies prior histories of epistaxis.  Denies personal or family history of coagulopathy. Patient is a 22-year-old male with history of schizophrenia, asthma and GERD brought in by EMS from [facility] for complaints of epistaxis.  Patient reports that when he was going to sleep around 9 PM, he noticed some bleeding in his left nose, also feels like there is blood going down his throat.  He called EMS and was thus brought to the emergency department.  Reports that EMS put gauze in his left nare to stop the bleeding and he felt that the bleeding has stopped, and does not feel like he has blood in the back of his throat.  Denies picking his nose, denies facial trauma.  Denies personal or family history of coagulopathy.

## 2023-04-10 NOTE — ED PROVIDER NOTE - PROGRESS NOTE DETAILS
Sam PGY2   Patient reassessed and reports that he has not had any more bleeding in the left nare.  Reports he is feeling okay.  Afrin applied to the left nare without any complications no bleeding noted afterwards.  Pending results of blood work and plan for discharge. Sam PGY2   Blood work showed normal H/H and normal coags.  Patient reassessed and has no bleeding on exam.  Afrin given to patient so that he can apply at home.  Spoke with social work–social work will arrange transfer back to [facility] for the patient.  Strict return precautions provided to the patient.

## 2023-04-10 NOTE — ED PROVIDER NOTE - PHYSICAL EXAMINATION
General: well appearing  Nose: dry gauze in place in the L nare, few small clots appreciated after patient blew his nose, no active bleeding appreciated after patient blew his nose, no blood appreciated inside the mouth or in the oropharynx General: Patient alert in no apparent distress  Skin: Dry and intact  HEENT: Head atraumatic. Oral mucosa moist.   Nose: dry gauze in place in the L nare, few small clots appreciated after patient blew his nose, no active bleeding appreciated after patient blew his nose, no blood appreciated inside the mouth or in the oropharynx  Eyes: Conjunctiva normal  Cardiac: Regular rhythm and rate. No pretibial edema b/l  Respiratory: Lungs clear b/l and symmetric. No respiratory distress. Able to speak in complete sentences.  Gastrointestinal: Abdomen soft, nondistended, nontender  Musculoskeletal: Moves all extremities spontaneously  Neurological: alert and oriented to person, place, and time  Psychiatric: Calm and cooperative

## 2023-04-10 NOTE — PROVIDER CONTACT NOTE (OTHER) - ASSESSMENT
Per provider, patient is cleared and is able to return to their previous residence, [facility] [address].  has spoken to relief staff Jamie ([phone]),  confirmed that patients mode of transportation is [employer] and that patient travels INDEPENDENTLY. Clinical provider is in agreement with [employer] back to group home. Verbal huddle regarding coordination of care completed with interdisciplinary team.     Writer scheduled taxi via [employer] (Inv# [id number]) w/ [employer] [phone].

## 2023-04-10 NOTE — ED PROVIDER NOTE - PATIENT PORTAL LINK FT
You can access the FollowMyHealth Patient Portal offered by [facility] by registering at the following website: [email or web address]. By joining [employer]’s FollowMyHealth portal, you will also be able to view your health information using other applications (apps) compatible with our system.

## 2023-04-10 NOTE — ED PROVIDER NOTE - CLINICAL SUMMARY MEDICAL DECISION MAKING FREE TEXT BOX
Patient is a 22-year-old male with history of schizophrenia, asthma and GERD brought in by EMS from [facility] for complaints of epistaxis. Patient is well-appearing on exam with a dry gauze placed in the left nare on exam.  Removed the glass which was partially soaked with blood and asked patient to blow his nose, few clots were appreciated in the tissue.  No active bleeding appreciated after patient blew his nose.  No blood appreciated inside the mouth or in the oropharynx.  Likely spontaneous anterior epistaxis secondary due to dryness or viral.  However given that patient is a high risk patient and has never had coags done here in the emergency department, we will obtained PT/PTT/INR to evaluate for coagulopathy.  We will also obtain a CBC to check hemoglobin.  We will monitor for couple hours here in the emergency department while awaiting blood work.  We will also apply Afrin for prophylaxis control.

## 2023-04-15 ENCOUNTER — EMERGENCY (EMERGENCY)
Facility: HOSPITAL | Age: 23
LOS: 1 days | Discharge: HOME | End: 2023-04-15
Attending: STUDENT IN AN ORGANIZED HEALTH CARE EDUCATION/TRAINING PROGRAM | Admitting: STUDENT IN AN ORGANIZED HEALTH CARE EDUCATION/TRAINING PROGRAM
Payer: MEDICAID

## 2023-04-15 VITALS
RESPIRATION RATE: 16 BRPM | HEART RATE: 78 BPM | DIASTOLIC BLOOD PRESSURE: 84 MMHG | SYSTOLIC BLOOD PRESSURE: 141 MMHG | OXYGEN SATURATION: 100 % | TEMPERATURE: 98 F

## 2023-04-15 VITALS
DIASTOLIC BLOOD PRESSURE: 88 MMHG | RESPIRATION RATE: 18 BRPM | SYSTOLIC BLOOD PRESSURE: 131 MMHG | TEMPERATURE: 97 F | HEART RATE: 94 BPM | OXYGEN SATURATION: 100 %

## 2023-04-15 LAB
ALBUMIN SERPL ELPH-MCNC: 4.5 G/DL — SIGNIFICANT CHANGE UP (ref 3.3–5)
ALP SERPL-CCNC: 44 U/L — SIGNIFICANT CHANGE UP (ref 40–120)
ALT FLD-CCNC: 10 U/L — SIGNIFICANT CHANGE UP (ref 4–41)
ANION GAP SERPL CALC-SCNC: 13 MMOL/L — SIGNIFICANT CHANGE UP (ref 7–14)
AST SERPL-CCNC: 18 U/L — SIGNIFICANT CHANGE UP (ref 4–40)
BASOPHILS # BLD AUTO: 0.05 K/UL — SIGNIFICANT CHANGE UP (ref 0–0.2)
BASOPHILS NFR BLD AUTO: 1.2 % — SIGNIFICANT CHANGE UP (ref 0–2)
BILIRUB SERPL-MCNC: 0.4 MG/DL — SIGNIFICANT CHANGE UP (ref 0.2–1.2)
BUN SERPL-MCNC: 15 MG/DL — SIGNIFICANT CHANGE UP (ref 7–23)
CALCIUM SERPL-MCNC: 9.4 MG/DL — SIGNIFICANT CHANGE UP (ref 8.4–10.5)
CHLORIDE SERPL-SCNC: 103 MMOL/L — SIGNIFICANT CHANGE UP (ref 98–107)
CO2 SERPL-SCNC: 21 MMOL/L — LOW (ref 22–31)
CREAT SERPL-MCNC: 1.09 MG/DL — SIGNIFICANT CHANGE UP (ref 0.5–1.3)
EGFR: 97 ML/MIN/1.73M2 — SIGNIFICANT CHANGE UP
EOSINOPHIL # BLD AUTO: 0.05 K/UL — SIGNIFICANT CHANGE UP (ref 0–0.5)
EOSINOPHIL NFR BLD AUTO: 1.2 % — SIGNIFICANT CHANGE UP (ref 0–6)
GLUCOSE SERPL-MCNC: 84 MG/DL — SIGNIFICANT CHANGE UP (ref 70–99)
HCT VFR BLD CALC: 44 % — SIGNIFICANT CHANGE UP (ref 39–50)
HGB BLD-MCNC: 13.7 G/DL — SIGNIFICANT CHANGE UP (ref 13–17)
IANC: 2.42 K/UL — SIGNIFICANT CHANGE UP (ref 1.8–7.4)
IMM GRANULOCYTES NFR BLD AUTO: 0.2 % — SIGNIFICANT CHANGE UP (ref 0–0.9)
LIDOCAIN IGE QN: 34 U/L — SIGNIFICANT CHANGE UP (ref 7–60)
LYMPHOCYTES # BLD AUTO: 1.41 K/UL — SIGNIFICANT CHANGE UP (ref 1–3.3)
LYMPHOCYTES # BLD AUTO: 33.4 % — SIGNIFICANT CHANGE UP (ref 13–44)
MCHC RBC-ENTMCNC: 24.4 PG — LOW (ref 27–34)
MCHC RBC-ENTMCNC: 31.1 GM/DL — LOW (ref 32–36)
MCV RBC AUTO: 78.3 FL — LOW (ref 80–100)
MONOCYTES # BLD AUTO: 0.28 K/UL — SIGNIFICANT CHANGE UP (ref 0–0.9)
MONOCYTES NFR BLD AUTO: 6.6 % — SIGNIFICANT CHANGE UP (ref 2–14)
NEUTROPHILS # BLD AUTO: 2.42 K/UL — SIGNIFICANT CHANGE UP (ref 1.8–7.4)
NEUTROPHILS NFR BLD AUTO: 57.4 % — SIGNIFICANT CHANGE UP (ref 43–77)
NRBC # BLD: 0 /100 WBCS — SIGNIFICANT CHANGE UP (ref 0–0)
NRBC # FLD: 0 K/UL — SIGNIFICANT CHANGE UP (ref 0–0)
PLATELET # BLD AUTO: 284 K/UL — SIGNIFICANT CHANGE UP (ref 150–400)
POTASSIUM SERPL-MCNC: 4.4 MMOL/L — SIGNIFICANT CHANGE UP (ref 3.5–5.3)
POTASSIUM SERPL-SCNC: 4.4 MMOL/L — SIGNIFICANT CHANGE UP (ref 3.5–5.3)
PROT SERPL-MCNC: 7 G/DL — SIGNIFICANT CHANGE UP (ref 6–8.3)
RBC # BLD: 5.62 M/UL — SIGNIFICANT CHANGE UP (ref 4.2–5.8)
RBC # FLD: 13.2 % — SIGNIFICANT CHANGE UP (ref 10.3–14.5)
SODIUM SERPL-SCNC: 137 MMOL/L — SIGNIFICANT CHANGE UP (ref 135–145)
WBC # BLD: 4.22 K/UL — SIGNIFICANT CHANGE UP (ref 3.8–10.5)
WBC # FLD AUTO: 4.22 K/UL — SIGNIFICANT CHANGE UP (ref 3.8–10.5)

## 2023-04-15 PROCEDURE — 74177 CT ABD & PELVIS W/CONTRAST: CPT | Mod: 26,MA

## 2023-04-15 PROCEDURE — 71045 X-RAY EXAM CHEST 1 VIEW: CPT | Mod: 26

## 2023-04-15 PROCEDURE — 99284 EMERGENCY DEPT VISIT MOD MDM: CPT

## 2023-04-15 PROCEDURE — 76705 ECHO EXAM OF ABDOMEN: CPT | Mod: 26

## 2023-04-15 RX ORDER — ACETAMINOPHEN 500 MG
650 TABLET ORAL ONCE
Refills: 0 | Status: COMPLETED | OUTPATIENT
Start: 2023-04-15 | End: 2023-04-15

## 2023-04-15 RX ORDER — FAMOTIDINE 10 MG/ML
20 INJECTION INTRAVENOUS ONCE
Refills: 0 | Status: COMPLETED | OUTPATIENT
Start: 2023-04-15 | End: 2023-04-15

## 2023-04-15 RX ORDER — SODIUM CHLORIDE 9 MG/ML
1000 INJECTION INTRAMUSCULAR; INTRAVENOUS; SUBCUTANEOUS ONCE
Refills: 0 | Status: COMPLETED | OUTPATIENT
Start: 2023-04-15 | End: 2023-04-15

## 2023-04-15 RX ADMIN — Medication 30 MILLILITER(S): at 09:34

## 2023-04-15 RX ADMIN — Medication 650 MILLIGRAM(S): at 09:34

## 2023-04-15 RX ADMIN — FAMOTIDINE 20 MILLIGRAM(S): 10 INJECTION INTRAVENOUS at 09:34

## 2023-04-15 RX ADMIN — SODIUM CHLORIDE 1000 MILLILITER(S): 9 INJECTION INTRAMUSCULAR; INTRAVENOUS; SUBCUTANEOUS at 09:35

## 2023-04-15 NOTE — ED ADULT TRIAGE NOTE - CHIEF COMPLAINT QUOTE
pt with co abdominal pain since last night. Pt co heart burn. pt with no co nausea no diarrhea but reports dark stool . pt with n fever or chils pt with co abdominal pain since last night. Pt co heart burn. pt with no co nausea no diarrhea but reports dark stool . pt with n fever or chills Pt from [facility]

## 2023-04-15 NOTE — ED ADULT NURSE NOTE - CHIEF COMPLAINT QUOTE
pt with co abdominal pain since last night. Pt co heart burn. pt with no co nausea no diarrhea but reports dark stool . pt with n fever or chills Pt from [facility]

## 2023-04-15 NOTE — ED PROVIDER NOTE - PATIENT PORTAL LINK FT
You can access the FollowMyHealth Patient Portal offered by [facility] by registering at the following website: [email or web address]. By joining [employer]’s FollowMyHealth portal, you will also be able to view your health information using other applications (apps) compatible with our system.

## 2023-04-15 NOTE — PROVIDER CONTACT NOTE (OTHER) - ASSESSMENT
BRIANA called [facility] ([phone]) and saniya with Elaina who reported patient travels independently. BRIANA called [facility] ([phone]) and soke with Elaina who reported patient travels independently.  BRIANA called [employer] ([phone]) spoke with Marta and received auth# [id number] for taxi p/u to [address] [place] [address] by [employer] ([phone]) by 2:15p.m.   Verbal huddle regarding coordination of care with interdisciplinary team completed. SW called [facility] ([phone]) and soke with Elaina who reported patient travels independently.  SW called [employer] ([phone]) spoke with Marta and received auth# [id number] for taxi p/u to [address] [place] [address] by [employer] ([phone]) by 2:15p.m.   SW called [employer] and verified patient  in20 minutes.  Verbal huddle regarding coordination of care with interdisciplinary team completed.

## 2023-04-15 NOTE — ED PROVIDER NOTE - CLINICAL SUMMARY MEDICAL DECISION MAKING FREE TEXT BOX
Patient is a 23-year-old male has medical history of asthma who is presenting with epigastric pain.  Has been going on since last night.  Feels like pressure.  Says it does not feel like burning.  Has had this before and had an endoscopy a couple years ago which he says showed inflammation.  He has no vomiting no diarrhea.  No surgical history.  Vital signs are within normal limits, patient is comfortable well-appearing.  Has tenderness to palpation of the right lower quadrant otherwise abdomen is soft.  Will give GI cocktail.  Given tenderness in right lower quadrant will obtain CT imaging to rule out appendicitis.  Given epigastric pain will obtain right upper quadrant sonogram to rule out cholecystitis.  Will reassess patient after diagnostics and treatment. Patient is a 23-year-old male has medical history of asthma who is presenting with epigastric pain.  Has been going on since last night.  Feels like pressure.  Says it does not feel like burning.  Has had this before and had an endoscopy a couple years ago which he says showed inflammation.  He has no vomiting no diarrhea.  No surgical history.  Vital signs are within normal limits, patient is comfortable well-appearing.  Has tenderness to palpation of the right lower quadrant otherwise abdomen is soft.  Will give GI cocktail.  Given tenderness in right lower quadrant will obtain CT imaging to rule out appendicitis.  Given epigastric pain will obtain right upper quadrant sonogram to rule out cholecystitis.  Will reassess patient after diagnostics and treatment.    ===================================  update (Willie Pruitt MD; attending emergency medicine and medical toxicology)       CBC unremarkable, CMP unremarkable CT abdomen pelvis no acute abdominal or pelvic pathology right upper quadrant sono normal.  Will p.o. challenge and JACE

## 2023-04-15 NOTE — ED ADULT NURSE NOTE - OBJECTIVE STATEMENT
received pt in bed  A and Ox3 in NAD resting comfortably  breathing is even and unlabored, abd soft non distended non tender,  skin color appropriate for ethnicity.

## 2023-04-18 ENCOUNTER — EMERGENCY (EMERGENCY)
Facility: HOSPITAL | Age: 23
LOS: 1 days | Discharge: HOME | End: 2023-04-18
Admitting: STUDENT IN AN ORGANIZED HEALTH CARE EDUCATION/TRAINING PROGRAM
Payer: MEDICAID

## 2023-04-18 VITALS
TEMPERATURE: 98 F | RESPIRATION RATE: 18 BRPM | OXYGEN SATURATION: 99 % | SYSTOLIC BLOOD PRESSURE: 134 MMHG | HEART RATE: 95 BPM | DIASTOLIC BLOOD PRESSURE: 82 MMHG

## 2023-04-18 LAB
ALBUMIN SERPL ELPH-MCNC: 4.1 G/DL — SIGNIFICANT CHANGE UP (ref 3.3–5)
ALP SERPL-CCNC: 48 U/L — SIGNIFICANT CHANGE UP (ref 40–120)
ALT FLD-CCNC: 12 U/L — SIGNIFICANT CHANGE UP (ref 4–41)
ANION GAP SERPL CALC-SCNC: 9 MMOL/L — SIGNIFICANT CHANGE UP (ref 7–14)
AST SERPL-CCNC: 15 U/L — SIGNIFICANT CHANGE UP (ref 4–40)
BILIRUB SERPL-MCNC: <0.2 MG/DL — SIGNIFICANT CHANGE UP (ref 0.2–1.2)
BUN SERPL-MCNC: 13 MG/DL — SIGNIFICANT CHANGE UP (ref 7–23)
CALCIUM SERPL-MCNC: 9.1 MG/DL — SIGNIFICANT CHANGE UP (ref 8.4–10.5)
CHLORIDE SERPL-SCNC: 106 MMOL/L — SIGNIFICANT CHANGE UP (ref 98–107)
CO2 SERPL-SCNC: 23 MMOL/L — SIGNIFICANT CHANGE UP (ref 22–31)
CREAT SERPL-MCNC: 1.05 MG/DL — SIGNIFICANT CHANGE UP (ref 0.5–1.3)
EGFR: 101 ML/MIN/1.73M2 — SIGNIFICANT CHANGE UP
GLUCOSE SERPL-MCNC: 82 MG/DL — SIGNIFICANT CHANGE UP (ref 70–99)
HCT VFR BLD CALC: 38.4 % — LOW (ref 39–50)
HGB BLD-MCNC: 12.1 G/DL — LOW (ref 13–17)
LIDOCAIN IGE QN: 47 U/L — SIGNIFICANT CHANGE UP (ref 7–60)
MCHC RBC-ENTMCNC: 24.5 PG — LOW (ref 27–34)
MCHC RBC-ENTMCNC: 31.5 GM/DL — LOW (ref 32–36)
MCV RBC AUTO: 77.7 FL — LOW (ref 80–100)
NRBC # BLD: 0 /100 WBCS — SIGNIFICANT CHANGE UP (ref 0–0)
NRBC # FLD: 0 K/UL — SIGNIFICANT CHANGE UP (ref 0–0)
PLATELET # BLD AUTO: 280 K/UL — SIGNIFICANT CHANGE UP (ref 150–400)
POTASSIUM SERPL-MCNC: 4 MMOL/L — SIGNIFICANT CHANGE UP (ref 3.5–5.3)
POTASSIUM SERPL-SCNC: 4 MMOL/L — SIGNIFICANT CHANGE UP (ref 3.5–5.3)
PROT SERPL-MCNC: 6.3 G/DL — SIGNIFICANT CHANGE UP (ref 6–8.3)
RBC # BLD: 4.94 M/UL — SIGNIFICANT CHANGE UP (ref 4.2–5.8)
RBC # FLD: 13.5 % — SIGNIFICANT CHANGE UP (ref 10.3–14.5)
SODIUM SERPL-SCNC: 138 MMOL/L — SIGNIFICANT CHANGE UP (ref 135–145)
TROPONIN T, HIGH SENSITIVITY RESULT: 8 NG/L — SIGNIFICANT CHANGE UP
WBC # BLD: 5.8 K/UL — SIGNIFICANT CHANGE UP (ref 3.8–10.5)
WBC # FLD AUTO: 5.8 K/UL — SIGNIFICANT CHANGE UP (ref 3.8–10.5)

## 2023-04-18 PROCEDURE — 99285 EMERGENCY DEPT VISIT HI MDM: CPT

## 2023-04-18 PROCEDURE — 93010 ELECTROCARDIOGRAM REPORT: CPT

## 2023-04-18 PROCEDURE — 71046 X-RAY EXAM CHEST 2 VIEWS: CPT | Mod: 26

## 2023-04-18 RX ORDER — SUCRALFATE 1 G
1 TABLET ORAL ONCE
Refills: 0 | Status: COMPLETED | OUTPATIENT
Start: 2023-04-18 | End: 2023-04-18

## 2023-04-18 RX ORDER — FAMOTIDINE 10 MG/ML
20 INJECTION INTRAVENOUS ONCE
Refills: 0 | Status: COMPLETED | OUTPATIENT
Start: 2023-04-18 | End: 2023-04-18

## 2023-04-18 RX ADMIN — FAMOTIDINE 20 MILLIGRAM(S): 10 INJECTION INTRAVENOUS at 14:47

## 2023-04-18 RX ADMIN — Medication 30 MILLILITER(S): at 14:47

## 2023-04-18 RX ADMIN — Medication 1 GRAM(S): at 14:48

## 2023-04-18 NOTE — ED PROVIDER NOTE - CLINICAL SUMMARY MEDICAL DECISION MAKING FREE TEXT BOX
23-year-old male with chart reviewed history of asthma, schizophrenia, GERD presents to the ER complaining of intermittent gas-like sensation in his chest feeling like he has to burp but he cannot, noting that its hard to describe.  Associated with food intake.  Patient states this has been going on for "a while".  Patient states that a few weeks ago he had darker appearing stools but that has stopped.  No longer has dark stools.    ekg performed non specific TW flattening in v4-v6, otherwise unchanged from previous  will get cxr, labs, lipase, trop and gi cocktail, reassess, likely dc.     patient seen in ER 3 days ago for similar complaints noting at that time he had abdominal pain that is now resolved patient had labs performed which were unremarkable CT abdomen pelvis unremarkable right upper quadrant sono unremarkable and chest x-ray unremarkable

## 2023-04-18 NOTE — ED PROVIDER NOTE - PHYSICAL EXAMINATION
Well appearing, well nourished, awake, alert, oriented to person, place, time/situation and in no apparent distress.    Airway patent    Eyes without scleral injection. No jaundice.    Strong pulse.    Respirations unlabored. Lungs CTABL     Abdomen soft, non-tender, no guarding.    Spine appears normal, range of motion is not limited, no muscle or joint tenderness.    Alert and oriented, no gross motor or sensory deficits.    Skin normal color for race, warm, dry and intact. No evidence of rash.

## 2023-04-18 NOTE — ED PROVIDER NOTE - PATIENT PORTAL LINK FT
You can access the FollowMyHealth Patient Portal offered by [facility] by registering at the following website: [email or web address]. By joining [employer]’s FollowMyHealth portal, you will also be able to view your health information using other applications (apps) compatible with our system.

## 2023-04-18 NOTE — ED ADULT NURSE NOTE - CHIEF COMPLAINT QUOTE
Pt brought in by EMS from [facility] for chest pain and dark colored stools, was seen in the ED  a few days ago. Pt denies SOB, N/V/D, fever or chills.

## 2023-04-18 NOTE — ED ADULT TRIAGE NOTE - CHIEF COMPLAINT QUOTE
Pt brought in by EMS from [facility] for chest pain and dark colored stools, was seen in the ED  a few days ago. Pt denies SOB, N/V/D, fever or chills.

## 2023-04-18 NOTE — ED ADULT NURSE NOTE - OBJECTIVE STATEMENT
pt aox4, ambulatory from [facility] for midsternal and epigastric pain. pt notes he was here a few days ago with similar symptoms d/c;d home after meds he was given, helped with discomfort. no nausea/vomiting. denies fevers or sick contacts. right ac 20g inserted using aseptic technique, with blood return noted and flushed well. stretcher in lowest position.

## 2023-04-18 NOTE — PROVIDER CONTACT NOTE (OTHER) - ASSESSMENT
SW informed by PA that pt is medically cleared for discharge to return to previous residence. [address] [place], [place]. SW has spoken to [employer] ([phone] ext.[phone]) who confirmed pt travels INDEPENDENTLY via [employer]. Transportation coordinated via [employer] ([phone]) for 8PM p/u. [facility] Inv# [id number].

## 2023-04-18 NOTE — ED PROVIDER NOTE - OBJECTIVE STATEMENT
23-year-old male with chart reviewed history of asthma, schizophrenia, GERD presents to the ER complaining of intermittent gas-like sensation in his chest feeling like he has to burp but he cannot, noting that its hard to describe.  Associated with food intake.  Patient states this has been going on for "a while".  Patient states that a few weeks ago he had darker appearing stools but that has stopped.  No longer has dark stools.  Normal in appearance and color.  Denies lightheadedness, dizziness, shortness of breath, fevers, chills, nausea, vomiting, dysuria, hematuria, diarrhea, constipation

## 2023-04-18 NOTE — ED PROVIDER NOTE - NSFOLLOWUPINSTRUCTIONS_ED_ALL_ED_FT
Follow up with your Primary Medical Doctor within 2-3days. If results or reports were given to you, show copies of your reports given to you. Take all of your medications as previously prescribed.    Follow up with Gastroenterology this week for outpatient endoscopy.      Take Pepcid 20 mg 30 minutes before meals 2x/day.      Take over the counter Maalox as needed    . Stop eating spicy and acidic foods.     Eat smaller meals more frequently.      Worsening pain, new fever, chills, nausea, vomiting, new chest pain/shortness of breath return to ER

## 2023-07-06 ENCOUNTER — EMERGENCY (EMERGENCY)
Facility: HOSPITAL | Age: 23
LOS: 1 days | Discharge: HOME | End: 2023-07-06
Attending: STUDENT IN AN ORGANIZED HEALTH CARE EDUCATION/TRAINING PROGRAM | Admitting: STUDENT IN AN ORGANIZED HEALTH CARE EDUCATION/TRAINING PROGRAM
Payer: MEDICAID

## 2023-07-06 VITALS
DIASTOLIC BLOOD PRESSURE: 99 MMHG | RESPIRATION RATE: 18 BRPM | OXYGEN SATURATION: 100 % | SYSTOLIC BLOOD PRESSURE: 142 MMHG | HEART RATE: 95 BPM | TEMPERATURE: 98 F

## 2023-07-06 PROCEDURE — 73130 X-RAY EXAM OF HAND: CPT | Mod: 26,RT

## 2023-07-06 PROCEDURE — 99285 EMERGENCY DEPT VISIT HI MDM: CPT

## 2023-07-06 RX ORDER — ACETAMINOPHEN 500 MG
650 TABLET ORAL ONCE
Refills: 0 | Status: COMPLETED | OUTPATIENT
Start: 2023-07-06 | End: 2023-07-06

## 2023-07-06 RX ORDER — IBUPROFEN 200 MG
400 TABLET ORAL ONCE
Refills: 0 | Status: COMPLETED | OUTPATIENT
Start: 2023-07-06 | End: 2023-07-06

## 2023-07-06 RX ADMIN — Medication 650 MILLIGRAM(S): at 09:23

## 2023-07-06 RX ADMIN — Medication 400 MILLIGRAM(S): at 09:23

## 2023-07-06 NOTE — CONSULT NOTE ADULT - SUBJECTIVE AND OBJECTIVE BOX
HPI  23y Male right hand dominant c/o R index finger pain and swelling for 2 days after playing basketball.   Pt states is unable to extend index finger.  Pt does not recall direct injury to index finger.  Denies fevers/chills. Denies numbness/tingling in R hand.    ROS  Negative unless otherwise specified in HPI.    PAST MEDICAL & SURGICAL Hx  PAST MEDICAL & SURGICAL HISTORY:  Asthma      GERD (gastroesophageal reflux disease)      Hiatal hernia      Schizophrenia      No significant past surgical history          MEDICATIONS  Home Medications:      ALLERGIES  shellfish (Anaphylaxis)  peanuts (Anaphylaxis)  No Known Drug Allergies  ketchup, orange juice (Anaphylaxis)      FAMILY Hx  FAMILY HISTORY:      SOCIAL Hx  Social History:      VITALS  Vital Signs Last 24 Hrs  T(C): 36.9 (06 Jul 2023 07:44), Max: 36.9 (06 Jul 2023 07:44)  T(F): 98.5 (06 Jul 2023 07:44), Max: 98.5 (06 Jul 2023 07:44)  HR: 95 (06 Jul 2023 07:44) (95 - 95)  BP: 142/99 (06 Jul 2023 07:44) (142/99 - 142/99)  BP(mean): --  RR: 18 (06 Jul 2023 07:44) (18 - 18)  SpO2: 100% (06 Jul 2023 07:44) (100% - 100%)    Parameters below as of 06 Jul 2023 07:44  Patient On (Oxygen Delivery Method): room air        PHYSICAL EXAM  Gen: Sitting in bed, NAD  Resp: No increased WOB  R Hand:  right index finger held in a flexed position  +Edema and +mild erythema over finger, swelling most pronounced over PIP/proximal phalanx, not fusiform  +TTP over volar/medal/lateral aspect of index finger proximal phalanx, no TTP along remainder of extremity or distal flexor tendon sheath; compartments soft  No pain with passive extension of right index finger, painless ROM of MCP/PIP/DIP joints  Motor: Flexion/extension/abduction/adduction of all unaffected fingers/thumb intact  Sensory: SILT throughout right index finger  +Rad pulse, _ finger WWP with cap refill <2 sec      IMAGING  XRs: R index finger soft tissue swelling, no acute fx or dislocation     ASSESSMENT & PLAN  23yMale w/ R index finger pain and swelling for 2 days. Low suspicion for flexor tenosynovitis.  - Finger splint, remove 3-4 x daily for ROM   -WBAT RUE  -pain control- anti inflammatories  -ice/cold compress, elevation  -no acute ortho hand surgery at this time  -Follow up w Dr Mak Tobias, ortho hand surgery attending on call [phone]

## 2023-07-06 NOTE — ED PROVIDER NOTE - CARE PROVIDER_API CALL
Patient's son called in stating that his Mom has had a cough and a runny nose for about a week now. She has tried Delsyum & cough drops. No other symptoms    He would like to know what else she can try?     Please Advise Mak Tobias Lanse  Orthopaedic Surgery  [address] [place] [address] [address]  [place] [address]  Phone: [phone]  Fax: [phone]  Follow Up Time:

## 2023-07-06 NOTE — ED PROVIDER NOTE - PHYSICAL EXAMINATION
Gen: WDWN, NAD, comfortable appearing, afebrile, hemodynamically stable   HEENT: Atraumatic head, mucous membranes moist  CV: RRR, +S1/S2, no M/R/G, equal b/l radial pulses 2+  Resp: CTAB, no W/R/R, no increased WOB   GI: Abdomen soft non-distended  MSK/Skin: Right 2nd digit/index finger TTP proximal to PIP and at PIP with mild swelling and no overlying rash, no lesions/abrasions/lacerations, decreased ROM; able to extend finger but with pain. Palpable radial pulse   Neuro: A&Ox4, moving all 4 extremities spontaneously  Psych: appropriate mood

## 2023-07-06 NOTE — ED PROVIDER NOTE - OBJECTIVE STATEMENT
23-year-old male with no past medical history presenting with right index finger injury.  Patient reports persistent right index finger pain since Tuesday, after playing basketball. Does not recall exact moment during basketball where he may have injured right finger, but does report a progressive onset of pain at night after playing, causing difficulty ranging finger, mild swelling, no associated rashes/fevers/chills. Denies any injection injuries.

## 2023-07-06 NOTE — ED PROVIDER NOTE - PROGRESS NOTE DETAILS
Laci, Attending  Xray shows no acute abnormalities other than soft tissue swelling. Patient reports mild improvement in pain with medications. Hand surgery consulted for low suspicion of flexor tenosynovitis given Kanavel signs who will see patient. Laci, PGY-3  Hand/ortho saw patient and reports no concern for flexor tenosynovitis at this time. Labs were previously ordered in preparation for possible hand intervention but given no acute concern for flexor tenosynovitis labs canceled. Splint placed by hand and will f/u with Dr. Tobias within 1 week. Patient now able to range digit with full extension. Will d/c with return precautions.

## 2023-07-06 NOTE — ED PROVIDER NOTE - NSFOLLOWUPINSTRUCTIONS_ED_ALL_ED_FT
Please follow up with Dr. Tobias within 1 week and wear splint as directed.     Return immediately to ER if:   You have increased redness over the joint, or increased swelling in the joint.    You suddenly cannot move your joint.    New Fever     When should I call my doctor?    You have increased pain even after you take medicine.    You have questions or concerns about your condition or care.

## 2023-07-06 NOTE — ED PROVIDER NOTE - CLINICAL SUMMARY MEDICAL DECISION MAKING FREE TEXT BOX
23-year-old male with no past medical history presenting with right index finger injury after basketball; right 2nd digit/index finger TTP proximal to PIP and at PIP with mild swelling and no overlying rash, no lesions/abrasions/lacerations, decreased ROM; able to extend finger but with pain, palpable radial pulse, no injection injury, afebrile with no infectious symptoms. Exam/history concerning for but not limited to fracture versus dislocation versus sprain.  Given physical exam flexor tenosynovitis was considered but low suspicion given no infectious symptoms, no injection injury, pain in setting of recent basketball game.  Will obtain x-ray, pain control and reassess

## 2023-07-06 NOTE — ED PROVIDER NOTE - PATIENT PORTAL LINK FT
You can access the FollowMyHealth Patient Portal offered by [facility] by registering at the following website: [email or web address]. By joining [employer]’s FollowMyHealth portal, you will also be able to view your health information using other applications (apps) compatible with our system.

## 2023-07-13 NOTE — ED BEHAVIORAL HEALTH ASSESSMENT NOTE - NS ED BHA DEMOGRAPHICS MEDICAL RECORD REVIEWED YES RECORDS
Suturegard Body: The suture ends were repeatedly re-tightened and re-clamped to achieve the desired tissue expansion. Hospital chart

## 2023-11-20 NOTE — ED PROVIDER NOTE - CHPI ED SYMPTOMS NEG
BP controlled today on Losartan/HCTZ 100/25 mg daily. No CP/SOB/HA.  BP at home has been normal, including this AM.   Previously on norvasc.   no agitation

## 2024-05-08 ENCOUNTER — EMERGENCY (EMERGENCY)
Facility: HOSPITAL | Age: 24
LOS: 1 days | Discharge: HOME | End: 2024-05-08
Attending: EMERGENCY MEDICINE | Admitting: EMERGENCY MEDICINE
Payer: MEDICAID

## 2024-05-08 VITALS
HEART RATE: 94 BPM | OXYGEN SATURATION: 98 % | RESPIRATION RATE: 17 BRPM | TEMPERATURE: 99 F | SYSTOLIC BLOOD PRESSURE: 126 MMHG | DIASTOLIC BLOOD PRESSURE: 87 MMHG

## 2024-05-08 VITALS
SYSTOLIC BLOOD PRESSURE: 122 MMHG | TEMPERATURE: 98 F | DIASTOLIC BLOOD PRESSURE: 78 MMHG | HEART RATE: 78 BPM | RESPIRATION RATE: 17 BRPM

## 2024-05-08 PROCEDURE — 99284 EMERGENCY DEPT VISIT MOD MDM: CPT

## 2024-05-08 RX ORDER — FAMOTIDINE 10 MG/ML
1 INJECTION INTRAVENOUS
Qty: 14 | Refills: 0
Start: 2024-05-08 | End: 2024-05-21

## 2024-05-08 RX ORDER — FAMOTIDINE 10 MG/ML
20 INJECTION INTRAVENOUS ONCE
Refills: 0 | Status: COMPLETED | OUTPATIENT
Start: 2024-05-08 | End: 2024-05-08

## 2024-05-08 RX ADMIN — FAMOTIDINE 20 MILLIGRAM(S): 10 INJECTION INTRAVENOUS at 11:15

## 2024-05-08 RX ADMIN — Medication 30 MILLILITER(S): at 11:15

## 2024-05-08 NOTE — ED ADULT NURSE NOTE - PAIN RATING/NUMBER SCALE (0-10): ACTIVITY
Rx Refill Note  Requested Prescriptions     Pending Prescriptions Disp Refills   • omeprazole (priLOSEC) 40 MG capsule [Pharmacy Med Name: Omeprazole Oral Capsule Delayed Release 40 MG] 60 capsule 0     Sig: TAKE 1 CAPSULE BY MOUTH TWO TIMES A DAY      Last office visit with prescribing clinician: 7/28/2021      Next office visit with prescribing clinician: Visit date not found            Kirill Childress MA  10/25/21, 07:50 EDT   5 (moderate pain)

## 2024-05-08 NOTE — CHART NOTE - NSCHARTNOTEFT_GEN_A_CORE
BRIANA contacted by medical team, patient ready for discharge and needs transportation. MD also requesting name of pharmacy to send new medications.  BRIANA called [facility] ([phone]) and spoke with Shine who confirmed patient can travel independently via taxi. SW was then transferred to [facility] from the health office who confirmed the pharmacy to send new medications is [facility] ([phone]).   BRIANA set up trip via [employer], invoice number: [id number], for taxi p/u to [address] [place] [address] by [employer] ([phone]) by 2:00p.m.   BRIANA called [employer] and verified patient .  BRIANA made MD aware of above.

## 2024-05-08 NOTE — ED PROVIDER NOTE - CLINICAL SUMMARY MEDICAL DECISION MAKING FREE TEXT BOX
24M h/o schizophrenia, asthma and GERD presents from [facility] 24M h/o schizophrenia, asthma and GERD presents from [facility] for acid reflux. Patient states that for the last year he has had burning in his chest associate with nausea and intermittent vomiting after eating.  He was given a prescription for omeprazole, has not followed up with a gastroenterologist.  Patient is very well-appearing, normal cardiopulmonary exam, symptoms consistent with prior episodes of acid reflux.  Patient given Mylanta and famotidine with resolution of symptoms was able to tolerate p.o. in the ED without pain or vomiting or nausea.  Will give prescription for famotidine and make referral for GI follow-up.

## 2024-05-08 NOTE — ED ADULT TRIAGE NOTE - HEART RATE (BEATS/MIN)
Physical Therapy Daily Treatment Note     Name: Alicia Mahoney Soliz  Clinic Number: [id number]    Therapy Diagnosis:   Encounter Diagnoses   Name Primary?    Left-sided weakness Yes    Impairment of balance     Abnormality of gait      Physician: Salvatore Vela MD    Visit Date: 11/21/2019    Physician Orders: PT Eval and Treat  Medical Diagnosis: Multiple Sclerosis   Evaluation Date: 8/15/2019  Authorization Period Expiration: 8/11/2020  Plan of Care Certification Period:12/12/19  Visit #/Visits authorized: 18/20    Time In: 9:00  Time Out: 10:15  Total Billable Time: 60 minutes    Precautions: Standard and Immunosuppression, MS    Subjective     Pt reports: that she had mouth surgery earlier this week and it went well. Reasoning for only coming once this week.  She was compliant with home exercise program.  Response to previous treatment: Good  Functional change: none    Pain: 2/10  Location: L side (leg and arm)    Objective     Alicia received therapeutic exercises to develop strength, endurance and ROM for 45 minutes including  Nustep x 8 min for L ankle ROM/cardiopulmonary efficiency training  Sit to stand lower surface warm up: BW x 10, 20lbs x 3  Toe raise in bars  Standing marches with focus on increasing hip FL/DF on foam  UE bike 3 min F/3 min B for postural control  Hamstring machine 35#  Quad machine 15#  LE bike x 6 min for quad/hamstring strengthening    Alicia received the following manual therapy techniques: Joint mobilizations and Soft tissue Mobilization were applied to the: R ankle for 5 minutes, including:  Talocrural mobilizations  STM to L gastroc    Alicia participated in neuromuscular re-education activities to improve: Balance, Kinesthetic and Proprioception for 10 minutes. The following activities were included:  Step ups on foam pad  Balance with pertubations on foam, tandem stance, feet together  Steps forward and sideways with minimal UE support    Alicia participated in dynamic  functional therapeutic activities to improve functional performance for 0  minutes, including:    Alicia participated in gait training to improve functional mobility and safety for 5 minutes, including:  Gait training with improving ankle movements  Gait training w quad cane     Alicia received the following supervised modalities after being cleared for contradictions: TENS:  Alicia received TENS electrical stimulation for pain to the R ankle. Pt received continuous mode at a rate of 0 pps for 0 minutes. Alicia tolerated treatment well without any adverse effects.     Alicia received hot pack for 0 minutes to R ankle    Home Exercises Provided and Patient Education Provided     Education provided:   - activity toleration, progression of therapy    Written Home Exercises Provided: yes.  Exercises were reviewed and Alicia was able to demonstrate them prior to the end of the session.  Alicia demonstrated good  understanding of the education provided.     See paper chart under Patient Instructions for exercises provided prior visit.    Assessment   Patient demonstrated good tolerance to strengthening and stability training this session  With minimal increase in adverse symptoms. Patient also noted better ankle stability while on foam and performing marches.    Alicia is progressing well towards her goals.   Pt prognosis is Excellent.     Pt will continue to benefit from skilled outpatient physical therapy to address the deficits listed in the problem list box on initial evaluation, provide pt/family education and to maximize pt's level of independence in the home and community environment.     Pt's spiritual, cultural and educational needs considered and pt agreeable to plan of care and goals.     Anticipated barriers to physical therapy: transportation, relapses in condition     Goals: tolerate more walking as well as increasing overall strength of L side    Plan     Incorporate more walking, improve L side strength, Improve  94 tolerance to physical activity     Zoltan Nuñez, PT

## 2024-05-08 NOTE — ED PROVIDER NOTE - NSFOLLOWUPINSTRUCTIONS_ED_ALL_ED_FT
You are given 2 medications while in the emergency department, Mylanta as well as famotidine otherwise known as Pepcid.  You were given a prescription for Pepcid and it was sent to the pharmacy that supplies your other medications, this was confirmed with Henry.  Take this medication to help alleviate symptoms of acid reflux.  Is important that you follow-up with a gastroenterologist for further evaluation and and prescriptions.  A referral was placed through our system and you should receive a call from our discharge planners to help set up an appointment.

## 2024-05-08 NOTE — ED ADULT NURSE NOTE - CHIEF COMPLAINT QUOTE
[facility] for intermittent acid reflux x 1 year. takes omperazole at home. denies chest pain

## 2024-05-08 NOTE — ED ADULT TRIAGE NOTE - CHIEF COMPLAINT QUOTE
[facility] for intermittent acid reflux x 1 year. takes omperazole at home. denies chest pain

## 2024-05-08 NOTE — ED PROVIDER NOTE - PATIENT PORTAL LINK FT
You can access the FollowMyHealth Patient Portal offered by [facility] by registering at the following website: [email or web address]. By joining [employer]’s FollowMyHealth portal, you will also be able to view your health information using other applications (apps) compatible with our system.

## 2024-05-16 NOTE — ED PROVIDER NOTE - RATE
[FreeTextEntry1] : The TSH and free t4 were normal. The lipid panel was fine. her CBC was normal. The last visit the TSI was negative, 87

## 2024-06-15 ENCOUNTER — EMERGENCY (EMERGENCY)
Facility: HOSPITAL | Age: 24
LOS: 1 days | Discharge: HOME | End: 2024-06-15
Admitting: STUDENT IN AN ORGANIZED HEALTH CARE EDUCATION/TRAINING PROGRAM
Payer: MEDICAID

## 2024-06-15 VITALS
OXYGEN SATURATION: 100 % | HEART RATE: 100 BPM | RESPIRATION RATE: 17 BRPM | SYSTOLIC BLOOD PRESSURE: 154 MMHG | TEMPERATURE: 98 F | DIASTOLIC BLOOD PRESSURE: 90 MMHG

## 2024-06-15 PROCEDURE — 99284 EMERGENCY DEPT VISIT MOD MDM: CPT

## 2024-06-15 RX ORDER — HALOPERIDOL DECANOATE 100 MG/ML
5 INJECTION INTRAMUSCULAR ONCE
Refills: 0 | Status: COMPLETED | OUTPATIENT
Start: 2024-06-15 | End: 2024-06-15

## 2024-06-15 RX ORDER — ACETAMINOPHEN 500 MG
650 TABLET ORAL ONCE
Refills: 0 | Status: COMPLETED | OUTPATIENT
Start: 2024-06-15 | End: 2024-06-15

## 2024-06-15 RX ADMIN — Medication 650 MILLIGRAM(S): at 18:54

## 2024-06-15 RX ADMIN — Medication 2 MILLIGRAM(S): at 17:35

## 2024-06-15 RX ADMIN — HALOPERIDOL DECANOATE 5 MILLIGRAM(S): 100 INJECTION INTRAMUSCULAR at 17:35

## 2024-06-15 RX ADMIN — Medication 650 MILLIGRAM(S): at 19:31

## 2024-06-15 NOTE — ED PROVIDER NOTE - NSFOLLOWUPINSTRUCTIONS_ED_ALL_ED_FT
Follow up with your PMD within 48-72 hrs. Show copies of your reports given to you.   Worsening, continued or new concerning symptoms return to the emergency department.    You have been given information necessary to follow up with the  [facility] ([facility]) Crisis center & other outpatient  psychiatric clinics within your community    • [facility] walk in Crisis centre  [address] [place] [address]  [phone] [email or web address]  Hours of operation:  Day	                                        Hours  Sunday                                  Closed  Monday                                9am - 3pm  Tuesday                                9am - 3pm  Wednesday                          9am - 3pm  Thursday                               9am - 3pm  Friday                                    9am - 3pm  Saturday                                Closed

## 2024-06-15 NOTE — ED PROVIDER NOTE - CLINICAL SUMMARY MEDICAL DECISION MAKING FREE TEXT BOX
22-year-old male with history of Schizophrenia, Asthma and GERD brought in by EMS from [facility] for complaints of his chronic AH getting worst. AH telling patient to hurt himself. Patient admits he is not listening to the hallucinations and has no plan. Admits compliance with medications: Haldol, Cogentin, Risperidal. Last saw counselor yesterday and compliant with weekly visit. Next psychiatrist appointment next month. Requesting change in medication regimen. Denies fever, headache, dizziness, SI/HI/VH, chills, chest pain, shortness of breath, abdominal pain, sick contact or recent travel. Denies alcohol use or other drugs.   Haldol, Ativan  BRIANA collateral  Dispo as per consult

## 2024-06-15 NOTE — ED ADULT TRIAGE NOTE - CHIEF COMPLAINT QUOTE
Pt c/o auditory hallucinations that have been getting worse over the last week and telling him to commit self harm. Pt requesting to be evaluated by psychiatrist. Pt endorsing compliance with psychiatric medications. Arrives from [facility] with EMS. Hx of HTN, HLD, schizophrenia, pre-diabetes. Pt to be seen in behavioral health ED.

## 2024-06-15 NOTE — ED ADULT NURSE NOTE - CHIEF COMPLAINT QUOTE
Pt c/o auditory hallucinations that have been getting worse over the last week and telling him to commit self harm. Pt requesting to be evaluated by psychiatrist. Pt endorsing compliance with psychiatric medications. Arrives from [facility] 74 with EMS. Hx of HTN, HLD, schizophrenia, pre-diabetes. Pt to be seen in behavioral health ED.

## 2024-06-15 NOTE — ED BEHAVIORAL HEALTH NOTE - BEHAVIORAL HEALTH NOTE
BRIANA requested by provider to obtain collateral from pt residence- [employer] [facility] [phone]. BRIANA requested by provider to obtain collateral from pt residence- [employer] [facility] #[phone].    SW spoke with staff Carolyne.  Carolyne stated that pt spoke to another worker; pt stated that he is hearing voices to hurt himself and that his head is hurting. Pt report no HI, VH or paranoid thoughts.  She stated that pt does not use drugs or drink alcohol. She stated that pt is complaint with is medications daily. She stated that pt did not have any altercations with peers or staff; she reported no triggers known for pt prior to this statement to staff. Therefore EMS was called to escort staff to ED. She stated that pt is not typically aggressive or violent.    Current Medications:  Losartan 50mg 1 tab daily  Olanzapine 20mg 1 tab daily/ 10mg 1 tab daily  Lovastatin 20mg 1 tab daily  Oneprazole 40mg 1 tab daily  Haldol IM 100mg  (every 4 weeks)  Acetaminophen 500mg --PRN  Meclizine 25 mg (liquid) PRN    Carolyne stated that pt mode of travel is taxi when returning to residence from the hospital; pt is able to travel independently via taxi.  BRIANA shared this information with provider.

## 2024-06-15 NOTE — ED PROVIDER NOTE - PATIENT PORTAL LINK FT
You can access the FollowMyHealth Patient Portal offered by [facility] by registering at the following website: [email or web address]. By joining [employer]’s FollowMyHealth portal, you will also be able to view your health information using other applications (apps) compatible with our system.

## 2024-06-15 NOTE — ED ADULT NURSE NOTE - OBJECTIVE STATEMENT
Hx: Schizophrenia. Pt from [place] outpatient. Pt endorsing medication compliance, endorsing HA due to hearing voices all of the time telling him to hurt himself. Pt denies listening to these voices and states he tries to sleep or distract himself, but now his head is hurting. Denies; SIB, HI, VH, paranoia, depression/anxiety, ETOH/drug use.

## 2024-06-15 NOTE — ED ADULT NURSE REASSESSMENT NOTE - NS ED NURSE REASSESS COMMENT FT1
Patient to be discharged .Called . [facility]. Spoke with  Alessia . interdisciplinary huddle completed  As peer Alessia  patient can travel in a cab. [facility] cab called .ETA  is 30-45 min.

## 2024-08-08 ENCOUNTER — EMERGENCY (EMERGENCY)
Facility: HOSPITAL | Age: 24
LOS: 1 days | Discharge: HOME | End: 2024-08-08
Attending: STUDENT IN AN ORGANIZED HEALTH CARE EDUCATION/TRAINING PROGRAM | Admitting: STUDENT IN AN ORGANIZED HEALTH CARE EDUCATION/TRAINING PROGRAM
Payer: MEDICAID

## 2024-08-08 VITALS
RESPIRATION RATE: 18 BRPM | WEIGHT: 201.94 LBS | HEIGHT: 69 IN | DIASTOLIC BLOOD PRESSURE: 88 MMHG | SYSTOLIC BLOOD PRESSURE: 130 MMHG | HEART RATE: 80 BPM | OXYGEN SATURATION: 100 % | TEMPERATURE: 98 F

## 2024-08-08 PROCEDURE — 99284 EMERGENCY DEPT VISIT MOD MDM: CPT | Mod: 25

## 2024-08-08 RX ORDER — ONDANSETRON HCL/PF 4 MG/2 ML
4 VIAL (ML) INJECTION ONCE
Refills: 0 | Status: COMPLETED | OUTPATIENT
Start: 2024-08-08 | End: 2024-08-08

## 2024-08-08 RX ORDER — ONDANSETRON HCL/PF 4 MG/2 ML
1 VIAL (ML) INJECTION
Qty: 1 | Refills: 0
Start: 2024-08-08 | End: 2024-08-10

## 2024-08-08 RX ADMIN — Medication 40 MILLIGRAM(S): at 08:40

## 2024-08-08 RX ADMIN — Medication 4 MILLIGRAM(S): at 08:40

## 2024-08-08 RX ADMIN — Medication 20 MILLIGRAM(S): at 08:39

## 2024-08-12 NOTE — ED PROVIDER NOTE - NS ED MD DISPO DISCHARGE
[FreeTextEntry3] : All medical record entries made by the Scribe were at my, Dr. Lauren Shikowitz-Behr, MD, direction and personally dictated by me on 08/06/2024. I have reviewed the chart and agree that the record accurately reflects my personal performance of the history, physical exam, assessment and plan. I have also personally directed, reviewed, and agreed with the chart. [Time Spent: ___ minutes] : I have spent [unfilled] minutes of time on the encounter. Home

## 2024-08-15 ENCOUNTER — EMERGENCY (EMERGENCY)
Facility: HOSPITAL | Age: 24
LOS: 1 days | Discharge: HOME | End: 2024-08-15
Attending: STUDENT IN AN ORGANIZED HEALTH CARE EDUCATION/TRAINING PROGRAM | Admitting: STUDENT IN AN ORGANIZED HEALTH CARE EDUCATION/TRAINING PROGRAM
Payer: MEDICAID

## 2024-08-15 VITALS
DIASTOLIC BLOOD PRESSURE: 83 MMHG | TEMPERATURE: 97 F | HEIGHT: 69 IN | RESPIRATION RATE: 16 BRPM | SYSTOLIC BLOOD PRESSURE: 127 MMHG | WEIGHT: 201.94 LBS | HEART RATE: 83 BPM | OXYGEN SATURATION: 99 %

## 2024-08-15 VITALS
DIASTOLIC BLOOD PRESSURE: 86 MMHG | OXYGEN SATURATION: 99 % | SYSTOLIC BLOOD PRESSURE: 122 MMHG | HEART RATE: 89 BPM | RESPIRATION RATE: 16 BRPM | TEMPERATURE: 98 F

## 2024-08-15 PROCEDURE — 99284 EMERGENCY DEPT VISIT MOD MDM: CPT

## 2024-08-15 RX ORDER — FAMOTIDINE 40 MG/1
20 TABLET, FILM COATED ORAL DAILY
Refills: 0 | Status: DISCONTINUED | OUTPATIENT
Start: 2024-08-15 | End: 2024-08-19

## 2024-08-15 RX ORDER — MAGNESIUM, ALUMINUM HYDROXIDE 200-225/5
30 SUSPENSION, ORAL (FINAL DOSE FORM) ORAL ONCE
Refills: 0 | Status: COMPLETED | OUTPATIENT
Start: 2024-08-15 | End: 2024-08-15

## 2024-08-15 RX ORDER — ONDANSETRON HCL/PF 4 MG/2 ML
4 VIAL (ML) INJECTION ONCE
Refills: 0 | Status: DISCONTINUED | OUTPATIENT
Start: 2024-08-15 | End: 2024-08-15

## 2024-08-15 RX ORDER — ONDANSETRON HCL/PF 4 MG/2 ML
4 VIAL (ML) INJECTION ONCE
Refills: 0 | Status: COMPLETED | OUTPATIENT
Start: 2024-08-15 | End: 2024-08-15

## 2024-08-15 RX ADMIN — FAMOTIDINE 20 MILLIGRAM(S): 40 TABLET, FILM COATED ORAL at 15:15

## 2024-08-15 RX ADMIN — Medication 30 MILLILITER(S): at 15:14

## 2024-08-15 RX ADMIN — Medication 4 MILLIGRAM(S): at 15:14

## 2024-08-15 NOTE — ED PROVIDER NOTE - PATIENT PORTAL LINK FT
TIME RECORD    Date: 08/20/2018    Start Time:  1400  Stop Time:  1500    PROCEDURES:    TIMED  Procedure Time Min.    Start:  Stop:     Start:  Stop:     Start:  Stop:     Start:  Stop:          UNTIMED  Procedure Time Min.    Start:  Stop:     Start:  Stop:      Total Timed Minutes:  30  Total Timed Units:  2  Total Untimed Units:  1  Charges Billed/# of units:  3    OUTPATIENT PHYSICAL THERAPY   PATIENT EVALUATION  Onset Date: 4 months ago  Primary Diagnosis:   1. Right knee pain, unspecified chronicity       Treatment Diagnosis: Gait abnormality  Past Medical History:   Diagnosis Date    Fatty liver     Former smoker     Osteoarthritis      Precautions: Standard  Prior Therapy: None  Medications: Pham Licea has a current medication list which includes the following prescription(s): celecoxib and ranitidine.  Nutrition:  Overweight  History of Present Illness: The patient reports a 3 to 4 month h/o right medial knee pain. She reports increased pain with ambulation on hard surfaces  Prior Level of Function: Independent  Social History: The patient work   Place of Residence (Steps/Adaptations): Single story raised home  Functional Deficits Leading to Referral/Nature of Injury: Decreased ambulatory status  Patient Therapy Goals: Return to PLOF    Subjective     Pham Licea states she has pain along the medial joint line.    Pain:  Location: knee   Description: Throbbing  Activities Which Increase Pain: Standing and Walking  Activities Which Decrease Pain: rest  Pain Scale: 0/10 at best 0/10 now  9/10 at worst    Objective     Posture: Decreased lumbar lordosis and forward head in standing  Palpation: moderate point tenderness noted with palpation of the medial joint line  Sensation: Intact    Range of Motion/Strength:     LE ROM:  Left  Right  Knee extension 0*  0*  Knee flexion  136*  134*    LE MMT:  Left  Right  Knee extension 5/5  4+/5  Knee flexion  5/5  5/5    Flexibility:    Left  Right  Hamstrings  75*  65*   Achilles  8*  0*    Girth measurements: Left  Right  5 cm above MJL 46.0 cm 46.2 cm  At MJL   41.0 cm 41.0 cm  5 cm below MJL 36.8 cm 37.0 cm    Gait: Without AD  Analysis: The patient ambulates with bilateral LE externally rotated in stance phase    Special Tests:  Left  Right  Lateral tracking neg  pos   Valgus stress  neg  neg    Varus stress  neg  neg  Lachman  neg  neg  Anterior drawer neg  neg    Other:   LEFS: 57/80:  28.8% impairment    Treatment:   Long sitting HSS 10 x 10 sec  Long sitting GSS 10 x 10 sec  Quad sets 3 x 10  SLR 3 x 10  Adductor squeeze 3 x 10  Hook lying abduction 3 x 10        Assessment       Initial Assessment (Pertinent finding, problem list and factors affecting outcome):   1. Right knee pain  2. Decreased LE flexibility  3. Pain with ambulation    Rehab Potiential: good    Short Term Goals (3 Weeks):   1. The patient will begin a written HEP  2. Idecrease pain at worst to 5/10  3. Decrease soft tissue tenderness to mild    Long Term Goals (6 Weeks):   1. Increase Achilles tendon flexibility to 8*  2. Increase knee strength to 5/5  3. The patient will ambulate on a community level without symptoms or gait deviation.  4. Decrease LEFS impairment to < 20%.     Plan     Certification Period: 8/20/2018 to 10/5/2018  Recommended Treatment Plan: 2 times per week for 6 weeks: Electrical Stimulation NMES, Manual Therapy, Patient Education, Therapeutic Activites, Therapeutic Exercise and kinesiotaping  Other Recommendations: LE flexibility exercises, closed chain knee strengthening, cardiovascular conditioning      Therapist: Federico Mar PT    I CERTIFY THE NEED FOR THESE SERVICES FURNISHED UNDER THIS PLAN OF TREATMENT AND WHILE UNDER MY CARE    Physician's comments: ________________________________________________________________________________________________________________________________________________      Physician's Name:  ___________________________________   You can access the FollowMyHealth Patient Portal offered by [facility] by registering at the following website: [email or web address]. By joining [employer]’s FollowMyHealth portal, you will also be able to view your health information using other applications (apps) compatible with our system.

## 2024-08-15 NOTE — ED ADULT TRIAGE NOTE - CHIEF COMPLAINT QUOTE
Pt coming from [facility] 74, c/o dizziness, nausea and generalized abd pain with diarrhea x 2-3 days. Denies fever, chills, vomting, chest pain. pmhx: schizophrenia, GERD

## 2024-08-15 NOTE — ED ADULT NURSE REASSESSMENT NOTE - NS ED NURSE REASSESS COMMENT FT1
break coverage rn. pt in spot 8a. A&Ox4. pt provided ginger ale and saltines. passed PO no signs of vomiting/pt denies nausea at this time. given DC paperwork awaiting social work for transport

## 2024-08-15 NOTE — ED ADULT NURSE NOTE - NSFALLUNIVINTERV_ED_ALL_ED
Bed/Stretcher in lowest position, wheels locked, appropriate side rails in place/Call bell, personal items and telephone in reach/Instruct patient to call for assistance before getting out of bed/chair/stretcher/Non-slip footwear applied when patient is off stretcher/[place] to call system/Physically safe environment - no spills, clutter or unnecessary equipment/Purposeful proactive rounding/Room/bathroom lighting operational, light cord in reach patient

## 2024-08-15 NOTE — PROVIDER CONTACT NOTE (OTHER) - ASSESSMENT
BRIANA alerted by provider that pt is cleared to return to prior residence, [facility] [address] (p:[phone]). SW contacted residence and no-one answered the phone. Called many times. From his previous visits  pt is able to return and mode of transport is taxi without supervision.   BRIANA arranged trip through [facility] portal (Inv# [id number]). Trip assigned to [facility] for ASAP pickup. 5:45.     No other SW needs identified at this time

## 2024-08-15 NOTE — ED PROVIDER NOTE - ATTENDING CONTRIBUTION TO CARE
I have personally performed a face to face medical and diagnostic evaluation of the patient. I have discussed with and reviewed the Resident's note and agree with the History, ROS, Physical Exam and MDM unless otherwise indicated. A brief summary of my personal evaluation and impression can be found below.    Nate CHAVEZ: 24-year-old male history of GERD schizophrenia presents from [facility] outpatient history of hiatal hernia presents with a chief complaint of mild periumbilical abdominal pain associated with mild lightheadedness, and nausea, patient reports he is scheduled to see a GI doctor in the upcoming weeks was recently seen in [facility] ER was told had possible GERD was sent home on meds returns today for persisting symptoms.  He is having very mild abdominal pain at this time is nauseous but not vomiting no fever no chest pain after breathing no urinary or bowel complaints no melanotic stools no rectal bleeding no groin complaints.    All other ROS negative, except as above and as per HPI and ROS section.    VITALS: Initial triage and subsequent vitals have been reviewed by me.  GEN APPEARANCE: Alert, non-toxic, well-appearing, NAD.  HEAD: Atraumatic.  EYES: PERRLa, EOMI, vision grossly intact.   NECK: Supple  CV: RRR, S1S2, no c/r/m/g. Cap refill <2 seconds. No bruits.   LUNGS: CTAB. No abnormal breath sounds.  ABDOMEN: Soft, NTND. No guarding or rebound.   MSK/EXT: No spinal or extremity point tenderness. No CVA ttp. Pelvis stable. No peripheral edema.  NEURO: Alert, follows commands. Weight bearing normal. Speech normal. Sensation and motor normal x4 extremities.   SKIN: Warm, dry and intact. No rash.  PSYCH: Appropriate    Plan/MDM: Exam vitals are stable nontoxic-appearing physical exam as above abdomen is soft and nontender patient is well-appearing no acute distress, patient was comfortably ambulatory, DDx concern for likely GERD likely in setting of hiatal hernia, of low concern for acute intra-abdominal surgical pathology as abdomen soft and nontender there is no fever he is well-appearing, he has good GI follow-up in the upcoming weeks, he has no signs to suggest acute blood loss from GI bleeding at this time, given his presentation we will give him GI cocktail will give him p.o. fluids and food, will reassess if his stay in the ED otherwise on eventful and is able to tolerate p.o. we will likely discharge him back to [facility] with GI follow-up.

## 2024-08-15 NOTE — ED PROVIDER NOTE - OBJECTIVE STATEMENT
Pt is a 25 y/o M coming from [place] [address] with PMHx of GERD, Asthma, hiatal hernia, schizophrenia, presenting to the ED with abdominal pain. The patient has been experiencing months of abdominal pain with light-headedness/dizziness, nausea (without vomiting). The patient has abdominal discomfort at rest, but has intermittent episodes of sharp abdominal pain in the middle of his abdomen that he feels is increasing in frequency. He says these episodes of pain are exacerbated by food intake. The pt says he has hard dark stools, strains to defecate, but is able to defecate every single day. He went to an urgent care 2 days ago with these complaints and they prescribed him a stool softener.   Pt denies fevers, chills, constipation, diarrhea, chest pain, shortness of breath, headache.  Of note, the patient recently came to the ED on 08/08 complaining of abdominal pain and vomiting, discharged with a diagnosis of GERD.    PMHx:   GERD, Asthma, Hiatal Hernia, Schizophrenia, HLD, HTN, pre-DM    Meds:  Zyprexa 20 mg, Losartan Potassium 50 mg, Lovastatin 20 mg , Omeprazole 40 mg, Haldol Decanoate  mg Q4 weeks, Olanzapine 10mg    Allergies: anaphylaxis to ketchup, orange juice, peanuts, shellfish    Surgical Hx: None    Social Hx: - alc, - drugs, - cigarettes Pt is a 23 y/o M coming from [place] [address] with PMHx of GERD, Asthma, hiatal hernia, schizophrenia, presenting to the ED with abdominal pain. The patient has been experiencing months of abdominal pain with light-headedness/dizziness, nausea (without vomiting). The patient has abdominal discomfort at rest, but has intermittent episodes of sharp abdominal pain in the middle of his abdomen that he feels is increasing in frequency. Describes pain as worsening with burning sensation exacerbated by supine position. He says these episodes of pain are exacerbated by food intake. The pt says he has hard dark stools, strains to defecate, but is able to defecate every single day. He went to an urgent care 2 days ago with these complaints and they prescribed him a stool softener.   Pt denies fevers, chills, constipation, diarrhea, chest pain, shortness of breath, headache.  Of note, the patient recently came to the ED on 08/08 complaining of abdominal pain and vomiting, discharged with a diagnosis of GERD.    PMHx:   GERD, Asthma, Hiatal Hernia, Schizophrenia, HLD, HTN, pre-DM    Meds:  Zyprexa 20 mg, Losartan Potassium 50 mg, Lovastatin 20 mg , Omeprazole 40 mg, Haldol Decanoate  mg Q4 weeks, Olanzapine 10mg    Allergies: anaphylaxis to ketchup, orange juice, peanuts, shellfish    Surgical Hx: None    Social Hx: - alc, - drugs, - cigarettes

## 2024-08-15 NOTE — ED PROVIDER NOTE - CLINICAL SUMMARY MEDICAL DECISION MAKING FREE TEXT BOX
Pt is a 23 y/o M coming from [place] [address] with PMHx of GERD, Asthma, hiatal hernia, schizophrenia, presenting to the ED with chronic abdominal pain, light-headedness, nausea that he feels is increasing in frequency x 2 day, recently diagnosed with GERD on the 8th. Pt is a 23 y/o M coming from [place] [address] with PMHx of GERD, Asthma, hiatal hernia, schizophrenia, presenting to the ED with chronic abdominal pain, light-headedness, nausea that he feels is increasing in frequency x 2 day, recently diagnosed with GERD on the 8th.  Likely GERD as this feels like burning vs gastric ulcer due to pain associated with food in-take. No signs of infection, anemia, or other concerning emergent etiology. Has endoscopy scheduled 08/27    PLAN:  zofran  pepcid  maalox  PO challenge  c/w appointment for endoscopy 08/27

## 2024-08-15 NOTE — ED ADULT NURSE NOTE - HISTORY OF COVID-19 VACCINATION
[facility] does not meet criteria for inpatient treatment.    Please call the resources provided by the crisis worker for partial hospitalizataion program.    Return to the ER sooner if she has thoughts of hurting herself or others.  
Vaccine status unknown

## 2024-08-15 NOTE — ED ADULT NURSE NOTE - CHIEF COMPLAINT QUOTE
Pt coming from [facility], c/o dizziness, nausea and generalized abd pain with diarrhea x 2-3 days. Denies fever, chills, vomting, chest pain. pmhx: schizophrenia, GERD

## 2024-08-15 NOTE — ED PROVIDER NOTE - NSFOLLOWUPINSTRUCTIONS_ED_ALL_ED_FT
- You were seen in the emergency department today for GERD.    - Lab and imaging results, if performed, were discussed with you along with your discharge diagnosis    - Follow up with your doctor in 1 week - bring copies of your results if you were given. If you are supposed  to follow up with a specialist, please bring your results with you as well.     - Return to the ED for any new, worsening, or concerning symptoms to you    - Continue all prescribed medications.    - Rest and keep yourself hydrated with fluids.    - Please continue with omeprazole. Please continue with zofran as needed.    - Please attend appointment for your schedule endoscopy on 08/27 - You were seen in the emergency department today for GERD.    - Lab and imaging results, if performed, were discussed with you along with your discharge diagnosis    - Follow up with your doctor in 1 week - bring copies of your results if you were given. If you are supposed  to follow up with a specialist, please bring your results with you as well.     - Return to the ED for any new, worsening, or concerning symptoms to you    - Continue all prescribed medications.    - Rest and keep yourself hydrated with fluids.    - Please continue with omeprazole alongside your other meds. Please continue with zofran as needed.    - Please attend appointment for your schedule endoscopy on 08/27

## 2024-08-15 NOTE — ED ADULT NURSE NOTE - OBJECTIVE STATEMENT
break coverage rn pt received to spot 8a. A&Ox4, ambulatory at baseline history of schizophrenia. pt c/o generalized abdominal pain assocaited with nausea and feeling of vomiting, but did not have any episodes of vomiting. denies fevers, chills, chest pain, SOB. pt given PO meds and educated on reasoning for medications. able to tolerate PO. Safety maintained. awaiting further orders

## 2024-09-22 ENCOUNTER — EMERGENCY (EMERGENCY)
Facility: HOSPITAL | Age: 24
LOS: 1 days | Discharge: HOME | End: 2024-09-22
Admitting: STUDENT IN AN ORGANIZED HEALTH CARE EDUCATION/TRAINING PROGRAM
Payer: MEDICAID

## 2024-09-22 VITALS
HEIGHT: 69 IN | RESPIRATION RATE: 18 BRPM | OXYGEN SATURATION: 97 % | TEMPERATURE: 99 F | HEART RATE: 104 BPM | DIASTOLIC BLOOD PRESSURE: 94 MMHG | SYSTOLIC BLOOD PRESSURE: 150 MMHG

## 2024-09-22 VITALS
TEMPERATURE: 100 F | DIASTOLIC BLOOD PRESSURE: 96 MMHG | SYSTOLIC BLOOD PRESSURE: 142 MMHG | RESPIRATION RATE: 20 BRPM | HEART RATE: 76 BPM | OXYGEN SATURATION: 100 %

## 2024-09-22 LAB
ALBUMIN SERPL ELPH-MCNC: 4.2 G/DL — SIGNIFICANT CHANGE UP (ref 3.3–5)
ALP SERPL-CCNC: 47 U/L — SIGNIFICANT CHANGE UP (ref 40–120)
ALT FLD-CCNC: 14 U/L — SIGNIFICANT CHANGE UP (ref 4–41)
ANION GAP SERPL CALC-SCNC: 14 MMOL/L — SIGNIFICANT CHANGE UP (ref 7–14)
AST SERPL-CCNC: 18 U/L — SIGNIFICANT CHANGE UP (ref 4–40)
BASOPHILS # BLD AUTO: 0.05 K/UL — SIGNIFICANT CHANGE UP (ref 0–0.2)
BASOPHILS NFR BLD AUTO: 1.1 % — SIGNIFICANT CHANGE UP (ref 0–2)
BILIRUB SERPL-MCNC: <0.2 MG/DL — SIGNIFICANT CHANGE UP (ref 0.2–1.2)
BUN SERPL-MCNC: 16 MG/DL — SIGNIFICANT CHANGE UP (ref 7–23)
CALCIUM SERPL-MCNC: 9.6 MG/DL — SIGNIFICANT CHANGE UP (ref 8.4–10.5)
CHLORIDE SERPL-SCNC: 105 MMOL/L — SIGNIFICANT CHANGE UP (ref 98–107)
CO2 SERPL-SCNC: 22 MMOL/L — SIGNIFICANT CHANGE UP (ref 22–31)
CREAT SERPL-MCNC: 1.06 MG/DL — SIGNIFICANT CHANGE UP (ref 0.5–1.3)
EGFR: 99 ML/MIN/1.73M2 — SIGNIFICANT CHANGE UP
EOSINOPHIL # BLD AUTO: 0.17 K/UL — SIGNIFICANT CHANGE UP (ref 0–0.5)
EOSINOPHIL NFR BLD AUTO: 3.6 % — SIGNIFICANT CHANGE UP (ref 0–6)
GLUCOSE SERPL-MCNC: 119 MG/DL — HIGH (ref 70–99)
HCT VFR BLD CALC: 42.1 % — SIGNIFICANT CHANGE UP (ref 39–50)
HGB BLD-MCNC: 13.5 G/DL — SIGNIFICANT CHANGE UP (ref 13–17)
IANC: 2.06 K/UL — SIGNIFICANT CHANGE UP (ref 1.8–7.4)
IMM GRANULOCYTES NFR BLD AUTO: 0.2 % — SIGNIFICANT CHANGE UP (ref 0–0.9)
LIDOCAIN IGE QN: 46 U/L — SIGNIFICANT CHANGE UP (ref 7–60)
LYMPHOCYTES # BLD AUTO: 2.19 K/UL — SIGNIFICANT CHANGE UP (ref 1–3.3)
LYMPHOCYTES # BLD AUTO: 46 % — HIGH (ref 13–44)
MCHC RBC-ENTMCNC: 25.1 PG — LOW (ref 27–34)
MCHC RBC-ENTMCNC: 32.1 GM/DL — SIGNIFICANT CHANGE UP (ref 32–36)
MCV RBC AUTO: 78.4 FL — LOW (ref 80–100)
MONOCYTES # BLD AUTO: 0.28 K/UL — SIGNIFICANT CHANGE UP (ref 0–0.9)
MONOCYTES NFR BLD AUTO: 5.9 % — SIGNIFICANT CHANGE UP (ref 2–14)
NEUTROPHILS # BLD AUTO: 2.06 K/UL — SIGNIFICANT CHANGE UP (ref 1.8–7.4)
NEUTROPHILS NFR BLD AUTO: 43.2 % — SIGNIFICANT CHANGE UP (ref 43–77)
NRBC # BLD: 0 /100 WBCS — SIGNIFICANT CHANGE UP (ref 0–0)
NRBC # FLD: 0 K/UL — SIGNIFICANT CHANGE UP (ref 0–0)
PLATELET # BLD AUTO: 303 K/UL — SIGNIFICANT CHANGE UP (ref 150–400)
POTASSIUM SERPL-MCNC: 4 MMOL/L — SIGNIFICANT CHANGE UP (ref 3.5–5.3)
POTASSIUM SERPL-SCNC: 4 MMOL/L — SIGNIFICANT CHANGE UP (ref 3.5–5.3)
PROT SERPL-MCNC: 7.2 G/DL — SIGNIFICANT CHANGE UP (ref 6–8.3)
RBC # BLD: 5.37 M/UL — SIGNIFICANT CHANGE UP (ref 4.2–5.8)
RBC # FLD: 13.3 % — SIGNIFICANT CHANGE UP (ref 10.3–14.5)
SODIUM SERPL-SCNC: 141 MMOL/L — SIGNIFICANT CHANGE UP (ref 135–145)
WBC # BLD: 4.76 K/UL — SIGNIFICANT CHANGE UP (ref 3.8–10.5)
WBC # FLD AUTO: 4.76 K/UL — SIGNIFICANT CHANGE UP (ref 3.8–10.5)

## 2024-09-22 PROCEDURE — 99284 EMERGENCY DEPT VISIT MOD MDM: CPT

## 2024-09-22 RX ORDER — FAMOTIDINE 10 MG/ML
1 INJECTION INTRAVENOUS
Qty: 60 | Refills: 0
Start: 2024-09-22 | End: 2024-10-21

## 2024-09-22 RX ORDER — MAGNESIUM, ALUMINUM HYDROXIDE 200-225/5
30 SUSPENSION, ORAL (FINAL DOSE FORM) ORAL ONCE
Refills: 0 | Status: COMPLETED | OUTPATIENT
Start: 2024-09-22 | End: 2024-09-22

## 2024-09-22 RX ORDER — FAMOTIDINE 10 MG/ML
20 INJECTION INTRAVENOUS DAILY
Refills: 0 | Status: ACTIVE | OUTPATIENT
Start: 2024-09-22 | End: 2025-08-21

## 2024-09-22 RX ADMIN — FAMOTIDINE 20 MILLIGRAM(S): 10 INJECTION INTRAVENOUS at 18:58

## 2024-09-22 RX ADMIN — Medication 30 MILLILITER(S): at 18:57

## 2024-09-22 NOTE — ED ADULT NURSE NOTE - CHIEF COMPLAINT QUOTE
Pt arrives via EMS from [facility] for abd pain x months, worsening today. + intermittent N/V. Had endoscopy 1 mo ago, pending results. Denies fevers/chills. Denies S/I, H/I, hallucinations. Calm & cooperative. PMHx: SCZ, asthma.

## 2024-09-22 NOTE — ED PROVIDER NOTE - CLINICAL SUMMARY MEDICAL DECISION MAKING FREE TEXT BOX
24yoM PMH asthma, GERD, schizophrenia, from [facility] p/w post prandial pain, burning sensation, over past 2 months. c/w his GERD, had EGD 1 month ago, awaiting his results. was given pepcid at that itme with relief of his symptoms however ran out a few weeks ago. Pt states he eats lots of fatty/spicy foods. 1 episode of nbnb emesis last night. Pt denies abd surgeries. no diarrhea, constipation, chest pain, shortness of breath, or dysuria    yovany obtain labs to check LFTs, lipase to r/o pancreatitis,   give pepcid/maalox  po challenge, if tolerates will dc

## 2024-09-22 NOTE — ED ADULT TRIAGE NOTE - CHIEF COMPLAINT QUOTE
Pt arrives via EMS from [facility] for abd pain x months, worsening today. + intermittent N/V. Had endoscopy 1 mo ago, pending results. Denies fevers/chills. Denies S/I, H/I, hallucinations. Calm & cooperative. PMHx: SCZ, asthma.

## 2024-09-22 NOTE — PROVIDER CONTACT NOTE (OTHER) - ASSESSMENT
SW alerted by provider that pt is cleared to return to prior residence, [facility] [address] (p:[phone]). SW contacted residence and spoke with Cedric who confirmed pt is able to return and mode of transport is taxi without supervision. Medical provider in agreement with mode of transport. Verbal huddle regarding coordination of care completed with team.     SW arranged trip through [facility] portal (Inv# [id number]. Trip assigned to [facility] for ASAP pickup.     No other SW needs identified at this time.

## 2024-09-22 NOTE — ED PROVIDER NOTE - PHYSICAL EXAMINATION
CONSTITUTIONAL: Well-appearing; well-nourished; in no apparent distress;  NECK/LYMPH: Supple; non-tender;  CARD: Normal S1, S2; no murmurs, rubs, or gallops noted  RESP: Normal chest excursion with respiration; breath sounds clear and equal bilaterally; no wheezes, rhonchi, or rales noted  ABD/GI: soft, non-distended; non-tender; no palpable organomegaly, no pulsatile mass  EXT/MS: moves all extremities  SKIN: Normal for age and race  NEURO: Awake, alert, oriented x 3, no gross deficits,  PSYCH: Normal mood; appropriate affect

## 2024-09-22 NOTE — ED PROVIDER NOTE - OBJECTIVE STATEMENT
24yoM PMH asthma, GERD, schizophrenia, from [facility] p/w post prandial pain, burning sensation, over past 2 months. c/w his GERD, had EGD 1 month ago, awaiting his results. was given pepcid at that itme with relief of his symptoms however ran out a few weeks ago. Pt states he eats lots of fatty/spicy foods. 1 episode of nbnb emesis last night. Pt denies abd surgeries. no diarrhea, constipation, chest pain, shortness of breath, or dysuria.,

## 2024-09-22 NOTE — ED ADULT NURSE REASSESSMENT NOTE - NS ED NURSE REASSESS COMMENT FT1
Patient is AOx4 and in no signs of acute distress. VS noted. Respirations even and unlabored, chest rise symmetrical b/l. Patient pending lab results. Patient appears comfortable on stretcher. Comfort measures maintained. Denies chest pain, SOB, N/V, fever or chills. Bed in lowest position. Safety maintained. Patient received from intake RN Lorri COVARRUBIAS Patient is AOx4 and in no signs of acute distress. VS noted. Respirations even and unlabored, chest rise symmetrical b/l. Patient pending lab results. Patient appears comfortable on stretcher. Comfort measures maintained. Denies chest pain, SOB, N/V, fever or chills. Bed in lowest position. Safety maintained.

## 2024-09-22 NOTE — ED PROVIDER NOTE - NSFOLLOWUPINSTRUCTIONS_ED_ALL_ED_FT
You were seen in our department for Reflux  start taking pepcid 20mg twice daily before meals.  make dietary changes  Follow up with your PMD in 48-72 hours for further monitoring.  Follow up with your GI specialist within 1 week for further evaluation.  if you develop any chest pain, dizziness, high fevers, weakness, numbness, tingling, vision changes, or any worsening symptoms return to our ED for evaluation.      Gastritis, Adult       Gastritis is swelling (inflammation) of the stomach. Gastritis can develop quickly (acute). It can also develop slowly over time (chronic). It is important to get help for this condition. If you do not get help, your stomach can bleed, and you can get sores (ulcers) in your stomach.      What are the causes?  This condition may be caused by:  •Germs that get to your stomach.      •Drinking too much alcohol.      •Medicines you are taking.      •Too much acid in the stomach.      •A disease of the intestines or stomach.      •Stress.      •An allergic reaction.      •Crohn's disease.      •Some cancer treatments (radiation).      Sometimes the cause of this condition is not known.      What are the signs or symptoms?  Symptoms of this condition include:  •Pain in your stomach.      •A burning feeling in your stomach.      •Feeling sick to your stomach (nauseous).      •Throwing up (vomiting).      •Feeling too full after you eat.      •Weight loss.      •Bad breath.      •Throwing up blood.      •Blood in your poop (stool).        How is this diagnosed?  This condition may be diagnosed with:  •Your medical history and symptoms.      •A physical exam.    •Tests. These can include:  •Blood tests.      •Stool tests.      •A procedure to look inside your stomach (upper endoscopy).      •A test in which a sample of tissue is taken for testing (biopsy).          How is this treated?  Treatment for this condition depends on what caused it. You may be given:  •Antibiotic medicine, if your condition was caused by germs.      •H2 blockers and similar medicines, if your condition was caused by too much acid.        Follow these instructions at home:    Medicines     •Take over-the-counter and prescription medicines only as told by your doctor.      •If you were prescribed an antibiotic medicine, take it as told by your doctor. Do not stop taking it even if you start to feel better.        Eating and drinking      •Eat small meals often, instead of large meals.      •Avoid foods and drinks that make your symptoms worse.      •Drink enough fluid to keep your pee (urine) pale yellow.      Alcohol use   • Do not drink alcohol if:  •Your doctor tells you not to drink.      •You are pregnant, may be pregnant, or are planning to become pregnant.      •If you drink alcohol:•Limit your use to:  •0–1 drink a day for women.      •0–2 drinks a day for men.        •Be aware of how much alcohol is in your drink. In the [place] one drink equals one 12 oz bottle of beer (355 mL), one 5 oz glass of wine (148 mL), or one 1½ oz glass of hard liquor (44 mL).        General instructions     •Talk with your doctor about ways to manage stress. You can exercise or do deep breathing, meditation, or yoga.      • Do not smoke or use products that have nicotine or tobacco. If you need help quitting, ask your doctor.      •Keep all follow-up visits as told by your doctor. This is important.        Contact a doctor if:    •Your symptoms get worse.      •Your symptoms go away and then come back.        Get help right away if:    •You throw up blood or something that looks like coffee grounds.      •You have black or dark red poop.      •You throw up any time you try to drink fluids.      •Your stomach pain gets worse.      •You have a fever.      •You do not feel better after one week.        Summary    •Gastritis is swelling (inflammation) of the stomach.      •You must get help for this condition. If you do not get help, your stomach can bleed, and you can get sores (ulcers).      •This condition is diagnosed with medical history, physical exam, or tests.      •You can be treated with medicines for germs or medicines to block too much acid in your stomach.      This information is not intended to replace advice given to you by your health care provider. Make sure you discuss any questions you have with your health care provider.

## 2024-09-22 NOTE — ED PROVIDER NOTE - PROGRESS NOTE DETAILS
pt reassesssed, symptoms improved, feeling much better. will dc w/ pepcid. labs wnl. return precautions discussed. SW consulted for transportation back to [facility].

## 2024-09-22 NOTE — ED PROVIDER NOTE - PATIENT PORTAL LINK FT
You can access the FollowMyHealth Patient Portal offered by [facility] by registering at the following website: [email or web address]. By joining [employer]’s FollowMyHealth portal, you will also be able to view your health information using other applications (apps) compatible with our system.

## 2024-11-11 ENCOUNTER — EMERGENCY (EMERGENCY)
Facility: HOSPITAL | Age: 24
LOS: 1 days | Discharge: HOME | End: 2024-11-11
Attending: EMERGENCY MEDICINE
Payer: MEDICAID

## 2024-11-11 VITALS
OXYGEN SATURATION: 99 % | RESPIRATION RATE: 17 BRPM | TEMPERATURE: 98 F | DIASTOLIC BLOOD PRESSURE: 77 MMHG | HEART RATE: 96 BPM | SYSTOLIC BLOOD PRESSURE: 135 MMHG

## 2024-11-11 VITALS
RESPIRATION RATE: 18 BRPM | HEART RATE: 75 BPM | HEIGHT: 69 IN | WEIGHT: 197.98 LBS | DIASTOLIC BLOOD PRESSURE: 76 MMHG | OXYGEN SATURATION: 99 % | TEMPERATURE: 98 F | SYSTOLIC BLOOD PRESSURE: 118 MMHG

## 2024-11-11 LAB
ALBUMIN SERPL ELPH-MCNC: 4 G/DL — SIGNIFICANT CHANGE UP (ref 3.3–5)
ALP SERPL-CCNC: 41 U/L — SIGNIFICANT CHANGE UP (ref 40–120)
ALT FLD-CCNC: 25 U/L — SIGNIFICANT CHANGE UP (ref 10–45)
ANION GAP SERPL CALC-SCNC: 13 MMOL/L — SIGNIFICANT CHANGE UP (ref 5–17)
APPEARANCE UR: CLEAR — SIGNIFICANT CHANGE UP
AST SERPL-CCNC: 19 U/L — SIGNIFICANT CHANGE UP (ref 10–40)
BACTERIA # UR AUTO: NEGATIVE /HPF — SIGNIFICANT CHANGE UP
BASOPHILS # BLD AUTO: 0.05 K/UL — SIGNIFICANT CHANGE UP (ref 0–0.2)
BASOPHILS NFR BLD AUTO: 1.3 % — SIGNIFICANT CHANGE UP (ref 0–2)
BILIRUB SERPL-MCNC: 0.3 MG/DL — SIGNIFICANT CHANGE UP (ref 0.2–1.2)
BILIRUB UR-MCNC: NEGATIVE — SIGNIFICANT CHANGE UP
BUN SERPL-MCNC: 23 MG/DL — SIGNIFICANT CHANGE UP (ref 7–23)
CALCIUM SERPL-MCNC: 9.6 MG/DL — SIGNIFICANT CHANGE UP (ref 8.4–10.5)
CAST: 0 /LPF — SIGNIFICANT CHANGE UP (ref 0–4)
CHLORIDE SERPL-SCNC: 103 MMOL/L — SIGNIFICANT CHANGE UP (ref 96–108)
CO2 SERPL-SCNC: 21 MMOL/L — LOW (ref 22–31)
COLOR SPEC: YELLOW — SIGNIFICANT CHANGE UP
CREAT SERPL-MCNC: 0.99 MG/DL — SIGNIFICANT CHANGE UP (ref 0.5–1.3)
DIFF PNL FLD: NEGATIVE — SIGNIFICANT CHANGE UP
EGFR: 108 ML/MIN/1.73M2 — SIGNIFICANT CHANGE UP
EOSINOPHIL # BLD AUTO: 0.12 K/UL — SIGNIFICANT CHANGE UP (ref 0–0.5)
EOSINOPHIL NFR BLD AUTO: 3.2 % — SIGNIFICANT CHANGE UP (ref 0–6)
GAS PNL BLDV: SIGNIFICANT CHANGE UP
GLUCOSE SERPL-MCNC: 90 MG/DL — SIGNIFICANT CHANGE UP (ref 70–99)
GLUCOSE UR QL: NEGATIVE MG/DL — SIGNIFICANT CHANGE UP
HAV IGM SER-ACNC: SIGNIFICANT CHANGE UP
HBV CORE IGM SER-ACNC: SIGNIFICANT CHANGE UP
HBV SURFACE AB SER-ACNC: REACTIVE — SIGNIFICANT CHANGE UP
HBV SURFACE AG SER-ACNC: SIGNIFICANT CHANGE UP
HBV SURFACE AG SER-ACNC: SIGNIFICANT CHANGE UP
HCT VFR BLD CALC: 40.8 % — SIGNIFICANT CHANGE UP (ref 39–50)
HCV AB S/CO SERPL IA: 0.06 S/CO — SIGNIFICANT CHANGE UP (ref 0–0.99)
HCV AB SERPL-IMP: SIGNIFICANT CHANGE UP
HGB BLD-MCNC: 12.9 G/DL — LOW (ref 13–17)
HIV 1 & 2 AB SERPL IA.RAPID: SIGNIFICANT CHANGE UP
IMM GRANULOCYTES NFR BLD AUTO: 0.3 % — SIGNIFICANT CHANGE UP (ref 0–0.9)
KETONES UR-MCNC: NEGATIVE MG/DL — SIGNIFICANT CHANGE UP
LEUKOCYTE ESTERASE UR-ACNC: NEGATIVE — SIGNIFICANT CHANGE UP
LYMPHOCYTES # BLD AUTO: 1.39 K/UL — SIGNIFICANT CHANGE UP (ref 1–3.3)
LYMPHOCYTES # BLD AUTO: 37 % — SIGNIFICANT CHANGE UP (ref 13–44)
MCHC RBC-ENTMCNC: 24.9 PG — LOW (ref 27–34)
MCHC RBC-ENTMCNC: 31.6 G/DL — LOW (ref 32–36)
MCV RBC AUTO: 78.8 FL — LOW (ref 80–100)
MONOCYTES # BLD AUTO: 0.37 K/UL — SIGNIFICANT CHANGE UP (ref 0–0.9)
MONOCYTES NFR BLD AUTO: 9.8 % — SIGNIFICANT CHANGE UP (ref 2–14)
NEUTROPHILS # BLD AUTO: 1.82 K/UL — SIGNIFICANT CHANGE UP (ref 1.8–7.4)
NEUTROPHILS NFR BLD AUTO: 48.4 % — SIGNIFICANT CHANGE UP (ref 43–77)
NITRITE UR-MCNC: NEGATIVE — SIGNIFICANT CHANGE UP
NRBC # BLD: 0 /100 WBCS — SIGNIFICANT CHANGE UP (ref 0–0)
PH UR: 6.5 — SIGNIFICANT CHANGE UP (ref 5–8)
PLATELET # BLD AUTO: 294 K/UL — SIGNIFICANT CHANGE UP (ref 150–400)
POTASSIUM SERPL-MCNC: 4.2 MMOL/L — SIGNIFICANT CHANGE UP (ref 3.5–5.3)
POTASSIUM SERPL-SCNC: 4.2 MMOL/L — SIGNIFICANT CHANGE UP (ref 3.5–5.3)
PROT SERPL-MCNC: 7.1 G/DL — SIGNIFICANT CHANGE UP (ref 6–8.3)
PROT UR-MCNC: NEGATIVE MG/DL — SIGNIFICANT CHANGE UP
RBC # BLD: 5.18 M/UL — SIGNIFICANT CHANGE UP (ref 4.2–5.8)
RBC # FLD: 13.1 % — SIGNIFICANT CHANGE UP (ref 10.3–14.5)
RBC CASTS # UR COMP ASSIST: 0 /HPF — SIGNIFICANT CHANGE UP (ref 0–4)
SODIUM SERPL-SCNC: 137 MMOL/L — SIGNIFICANT CHANGE UP (ref 135–145)
SP GR SPEC: 1.03 — SIGNIFICANT CHANGE UP (ref 1–1.03)
SQUAMOUS # UR AUTO: 0 /HPF — SIGNIFICANT CHANGE UP (ref 0–5)
UROBILINOGEN FLD QL: 1 MG/DL — SIGNIFICANT CHANGE UP (ref 0.2–1)
WBC # BLD: 3.76 K/UL — LOW (ref 3.8–10.5)
WBC # FLD AUTO: 3.76 K/UL — LOW (ref 3.8–10.5)
WBC UR QL: 0 /HPF — SIGNIFICANT CHANGE UP (ref 0–5)

## 2024-11-11 PROCEDURE — 82803 BLOOD GASES ANY COMBINATION: CPT

## 2024-11-11 PROCEDURE — 86706 HEP B SURFACE ANTIBODY: CPT

## 2024-11-11 PROCEDURE — 80074 ACUTE HEPATITIS PANEL: CPT

## 2024-11-11 PROCEDURE — 96375 TX/PRO/DX INJ NEW DRUG ADDON: CPT

## 2024-11-11 PROCEDURE — 84132 ASSAY OF SERUM POTASSIUM: CPT

## 2024-11-11 PROCEDURE — 99284 EMERGENCY DEPT VISIT MOD MDM: CPT | Mod: 25

## 2024-11-11 PROCEDURE — 99285 EMERGENCY DEPT VISIT HI MDM: CPT | Mod: 25

## 2024-11-11 PROCEDURE — 82947 ASSAY GLUCOSE BLOOD QUANT: CPT

## 2024-11-11 PROCEDURE — 87491 CHLMYD TRACH DNA AMP PROBE: CPT

## 2024-11-11 PROCEDURE — 80053 COMPREHEN METABOLIC PANEL: CPT

## 2024-11-11 PROCEDURE — 87086 URINE CULTURE/COLONY COUNT: CPT

## 2024-11-11 PROCEDURE — 84295 ASSAY OF SERUM SODIUM: CPT

## 2024-11-11 PROCEDURE — 86703 HIV-1/HIV-2 1 RESULT ANTBDY: CPT

## 2024-11-11 PROCEDURE — 83605 ASSAY OF LACTIC ACID: CPT

## 2024-11-11 PROCEDURE — 87340 HEPATITIS B SURFACE AG IA: CPT

## 2024-11-11 PROCEDURE — 85025 COMPLETE CBC W/AUTO DIFF WBC: CPT

## 2024-11-11 PROCEDURE — 85018 HEMOGLOBIN: CPT

## 2024-11-11 PROCEDURE — 82330 ASSAY OF CALCIUM: CPT

## 2024-11-11 PROCEDURE — 87591 N.GONORRHOEAE DNA AMP PROB: CPT

## 2024-11-11 PROCEDURE — 81001 URINALYSIS AUTO W/SCOPE: CPT

## 2024-11-11 PROCEDURE — 86780 TREPONEMA PALLIDUM: CPT

## 2024-11-11 PROCEDURE — 74177 CT ABD & PELVIS W/CONTRAST: CPT | Mod: MC

## 2024-11-11 PROCEDURE — 85014 HEMATOCRIT: CPT

## 2024-11-11 PROCEDURE — 82435 ASSAY OF BLOOD CHLORIDE: CPT

## 2024-11-11 PROCEDURE — 74177 CT ABD & PELVIS W/CONTRAST: CPT | Mod: 26,MC

## 2024-11-11 PROCEDURE — 96374 THER/PROPH/DIAG INJ IV PUSH: CPT | Mod: XU

## 2024-11-11 RX ORDER — METHYLPREDNISOLONE ACETATE 80 MG/ML
40 INJECTION, SUSPENSION INTRALESIONAL; INTRAMUSCULAR; INTRASYNOVIAL; SOFT TISSUE ONCE
Refills: 0 | Status: COMPLETED | OUTPATIENT
Start: 2024-11-11 | End: 2024-11-11

## 2024-11-11 RX ORDER — DIPHENHYDRAMINE HCL 12.5MG/5ML
50 ELIXIR ORAL ONCE
Refills: 0 | Status: COMPLETED | OUTPATIENT
Start: 2024-11-11 | End: 2024-11-11

## 2024-11-11 RX ORDER — SODIUM CHLORIDE 9 MG/ML
1000 INJECTION, SOLUTION INTRAMUSCULAR; INTRAVENOUS; SUBCUTANEOUS ONCE
Refills: 0 | Status: COMPLETED | OUTPATIENT
Start: 2024-11-11 | End: 2024-11-11

## 2024-11-11 RX ORDER — ACETAMINOPHEN 500 MG
1000 TABLET ORAL ONCE
Refills: 0 | Status: COMPLETED | OUTPATIENT
Start: 2024-11-11 | End: 2024-11-11

## 2024-11-11 RX ADMIN — SODIUM CHLORIDE 1000 MILLILITER(S): 9 INJECTION, SOLUTION INTRAMUSCULAR; INTRAVENOUS; SUBCUTANEOUS at 08:04

## 2024-11-11 RX ADMIN — METHYLPREDNISOLONE ACETATE 40 MILLIGRAM(S): 80 INJECTION, SUSPENSION INTRALESIONAL; INTRAMUSCULAR; INTRASYNOVIAL; SOFT TISSUE at 08:03

## 2024-11-11 RX ADMIN — Medication 50 MILLIGRAM(S): at 11:04

## 2024-11-11 RX ADMIN — Medication 400 MILLIGRAM(S): at 08:05

## 2024-11-11 NOTE — ED ADULT NURSE NOTE - NSFALLUNIVINTERV_ED_ALL_ED
Bed/Stretcher in lowest position, wheels locked, appropriate side rails in place/Call bell, personal items and telephone in reach/Instruct patient to call for assistance before getting out of bed/chair/stretcher/Non-slip footwear applied when patient is off stretcher/[place] to call system/Physically safe environment - no spills, clutter or unnecessary equipment/Purposeful proactive rounding/Room/bathroom lighting operational, light cord in reach

## 2024-11-11 NOTE — ED PROVIDER NOTE - NSFOLLOWUPINSTRUCTIONS_ED_ALL_ED_FT
FOR PAIN, YOU MAY TAKE TYLENOL (acetaminophen) 1,000mg AND/OR IBUPROFEN (advil or motrin) 600mg every 6 hours as needed. FOLLOW THE INSTRUCTIONS ON THE LABEL/CONTAINER.    IF YOUR SYMPTOMS PERSIST, PLEASE FOLLOW UP WITH UROLOGY.   Clinic information provided.     RETURN TO THE EMERGENCY DEPARTMENT IF YOU EXPERIENCE ANY NEW/CONCERNING/WORSENING SYMPTOMS SUCH AS BUT NOT LIMITED TO: worsening penile pain, prolonged erection, discharge from the penis, lesions on the penis, abdominal pain, vomiting, fever, or difficulty urinating, or any other concerns.

## 2024-11-11 NOTE — ED PROVIDER NOTE - PHYSICAL EXAMINATION
GEN: NAD, awake, eyes open spontaneously  EYES: normal conjunctiva, perrl  ENT: NCAT, MMM, Trachea midline  CHEST/LUNGS: Non-tachypneic, CTAB, bilateral breath sounds  CARDIAC: Non-tachycardic, normal perfusion  ABDOMEN: Soft, diffuse tenderness to palpation - greatest in suprapubic and bilateral lower abd quadrants, No rebound/guarding  : Chaperoned by Dr. Hanna and Dr. Connor. Diffuse tenderness to penile shaft. Uncircumcised. No lesions or discharge noted. No testicular swelling or tenderness to palpation.   MSK: No edema, no gross deformity of extremities  SKIN: No rashes, no petechiae, no vesicles  NEURO: CN grossly intact, normal coordination, no focal motor or sensory deficits  PSYCH: Alert, appropriate, cooperative, with capacity and insight

## 2024-11-11 NOTE — ED ADULT NURSE NOTE - OBJECTIVE STATEMENT
pt is 24y M, pmhx schizophrenia, HTN, HLD, BIBEMS from [facility] c/o dysuria, urinary discomfort for 2 days, pt denies any discharge, denies hx of STI, pt states not sexually active, pt A&Ox4, ambulatory, updated on plan of care

## 2024-11-11 NOTE — ED ADULT NURSE REASSESSMENT NOTE - NS ED NURSE REASSESS COMMENT FT1
Rebecca (Social Work) at bedside during IV discontinuation and discharge information. Patient walked independently with a steady gait to discharge area accompanied by social work.

## 2024-11-11 NOTE — ED PROVIDER NOTE - DATE/TIME 1

## 2024-11-11 NOTE — ED PROVIDER NOTE - PROGRESS NOTE DETAILS
Rosalba Rowe,  (PGY-2): CT and labs unremarkable. Urine negative. Patient informed of results. He lives in [place], will have social work help arrange ride back. Stable for discharge. Will give uro referral if symptoms persist.

## 2024-11-11 NOTE — ED PROVIDER NOTE - OBJECTIVE STATEMENT
24M with hx of HLD and schizophrenia presents to the ED complaining of penile pain x 3 days. States his pain is associated with the urge to urinate and feels pain when trying to get the urine out. Pain is localized to the entire penile shaft. Denies dysuria or hematuria. No associated abdominal pain, testicular pain, or swelling. Denies current sexual activity or hx of STIs. Denies penile lesions or discharge. No trauma or injuries to genital area. Patient reports he had an episode of nausea after eating take out on Saturday but no nausea or vomiting since. Denies fever or chills. No abdominal surgeries. No hx of kidney stones.

## 2024-11-11 NOTE — ED PROVIDER NOTE - CLINICAL SUMMARY MEDICAL DECISION MAKING FREE TEXT BOX
24M hx schizophrenia and HLD presents with 3 days of intermittent penile pain associated with urge to urinate. Denies associated abdominal pain, however tender throughout abdomen on exam, greatest in suprapubic and lower quadrants. Genital exam unremarkable - no significant penile or testicular pain, lesions or discharge, but significantly tender to light touch along penile shaft. Ddx includes penile injury, infectious source, urinary tract infection, kidney stone. Will obtain to eval to intra-abdominal/pelvic pathology, however due to allergy to shellfish, will premedicate prior. Will also check urine, STI screen, pain control and reassess.

## 2024-11-11 NOTE — ED PROVIDER NOTE - ATTENDING CONTRIBUTION TO CARE
Dr. Hanna: I have personally performed a face to face bedside history and physical examination of this patient. I have discussed the history, examination, review of systems, assessment and plan of management with the resident. I have reviewed the electronic medical record and amended it to reflect my history, review of systems, physical exam, assessment and plan.    Dr. Hanna: 24-year-old male history of hyperlipidemia, hypertension, prediabetes not on medications, schizophrenia, not sexually active for years, presenting with intermittent penile pain for the last 3 days.  States pain is worse when he tries to urinate.  Denies dysuria, hematuria, penile discharge.  Denies abdominal pain, testicular pain, fevers, chills, nausea, vomiting, rashes.  Never had this pain before.  No testicular pain, no history of kidney stones.    Gen: No acute distress  HEENT: Mucous membranes moist, pink conjunctivae, EOMI  CV: RRR, no clubbing/cyanosis/edema  Resp: CTAB  GI: Abdomen soft, diffuse abdominal tenderness to palpation  : No CVAT, chaperone Dr. Connor, diffuse penile tenderness to palpation, no penile discharge, no rashes, no testicular tenderness to palpation, no inguinal tenderness to palpation, no signs of Harsh's. No crepitus  Neuro: A&O x 3, moving all 4 extremities  MSK: No spine or joint tenderness to palpation  Skin: No rashes    Well-appearing patient with past medical history as above presenting with penile and abdominal pain.  No signs of infection on exam.  Will check STIs, urine culture, labs, CT abdomen pelvis, reassess.

## 2024-11-11 NOTE — ED ADULT NURSE REASSESSMENT NOTE - NS ED NURSE REASSESS COMMENT FT1
Report received from PAULA Guajardo. Patient VSS, patient A&Ox4, breathing spontaneously and unlabored. Patient ambulating independently with steady gait. Patient instructed to give urine sample when possible.

## 2024-11-11 NOTE — ED PROVIDER NOTE - PATIENT PORTAL LINK FT
You can access the FollowMyHealth Patient Portal offered by [facility] by registering at the following website: [email or web address]. By joining [employer]’s FollowMyHealth portal, you will also be able to view your health information using other applications (apps) compatible with our system.

## 2024-11-11 NOTE — CHART NOTE - NSCHARTNOTEFT_GEN_A_CORE
ED SW-    LMSW consulted by ED MD for patient in ED from [place] in need of transportation for d/c. Per chart, patient is a 25 y/o M pmhx of "HLD and schizophrenia presents to the ED complaining of penile pain x 3 days." Per ED MD, patient medically cleared for d/c and can independently utilize cab.    LMSW met with patient at bedside to introduce self and role. Patient appeared a&ox4 and verbalized understanding. Patient made aware of d/c and confirmed he travels by medicaid cab. Patient stated address is [address] [place] [address] in [place], [facility] [address] ([employer]). LMSW contacted staff member Ms. Geiger [phone] from [employer] to inform of patient's d/c, confirm mode of transport, and confirm address. Ms. Geiger confirmed address above and stated patient can utilize bus or medicaid cab. Ms. Geiger also confirmed patient can return to residence. LMSW scheduled medicaid cab via [employer] Inv# [id number]. ED MD, ED RN, and patient made aware of trip arrangements. Patient denied further questions/concerns. No further SW needs. SW to remain available.

## 2024-11-12 LAB
C TRACH RRNA SPEC QL NAA+PROBE: SIGNIFICANT CHANGE UP
CULTURE RESULTS: SIGNIFICANT CHANGE UP
N GONORRHOEA RRNA SPEC QL NAA+PROBE: SIGNIFICANT CHANGE UP
SPECIMEN SOURCE: SIGNIFICANT CHANGE UP
T PALLIDUM AB TITR SER: NEGATIVE — SIGNIFICANT CHANGE UP

## 2024-11-20 ENCOUNTER — EMERGENCY (EMERGENCY)
Facility: HOSPITAL | Age: 24
LOS: 1 days | Discharge: HOME | End: 2024-11-20
Attending: EMERGENCY MEDICINE
Payer: MEDICAID

## 2024-11-20 VITALS
SYSTOLIC BLOOD PRESSURE: 117 MMHG | RESPIRATION RATE: 16 BRPM | HEART RATE: 77 BPM | DIASTOLIC BLOOD PRESSURE: 77 MMHG | OXYGEN SATURATION: 98 % | TEMPERATURE: 98 F

## 2024-11-20 VITALS
TEMPERATURE: 99 F | DIASTOLIC BLOOD PRESSURE: 78 MMHG | HEART RATE: 84 BPM | SYSTOLIC BLOOD PRESSURE: 118 MMHG | HEIGHT: 69 IN | RESPIRATION RATE: 20 BRPM | WEIGHT: 190.04 LBS

## 2024-11-20 PROCEDURE — 99284 EMERGENCY DEPT VISIT MOD MDM: CPT

## 2024-11-20 RX ORDER — MINERAL OIL
1 OIL (ML) MISCELLANEOUS ONCE
Refills: 0 | Status: DISCONTINUED | OUTPATIENT
Start: 2024-11-20 | End: 2024-11-20

## 2024-12-13 NOTE — ED PROVIDER NOTE - NS_EDPROVIDERDISPOUSERTYPE_ED_A_ED
[Takes medication as prescribed] : takes [None] : Patient does not have any barriers to medication adherence [Yes] : Reviewed medication list for presence of high-risk medications. [Blood Thinners] : blood thinners I have personally evaluated and examined the patient. The Attending was available to me as a supervising provider if needed.

## 2024-12-18 ENCOUNTER — EMERGENCY (EMERGENCY)
Facility: HOSPITAL | Age: 24
LOS: 1 days | Discharge: OTHER INSTITUTION | End: 2024-12-18
Attending: EMERGENCY MEDICINE | Admitting: EMERGENCY MEDICINE
Payer: MEDICAID

## 2024-12-18 VITALS
TEMPERATURE: 100 F | HEART RATE: 125 BPM | SYSTOLIC BLOOD PRESSURE: 138 MMHG | OXYGEN SATURATION: 97 % | DIASTOLIC BLOOD PRESSURE: 102 MMHG | RESPIRATION RATE: 19 BRPM | HEIGHT: 69 IN

## 2024-12-18 LAB
ALBUMIN SERPL ELPH-MCNC: 4.1 G/DL — SIGNIFICANT CHANGE UP (ref 3.3–5)
ALP SERPL-CCNC: 42 U/L — SIGNIFICANT CHANGE UP (ref 40–120)
ALT FLD-CCNC: 20 U/L — SIGNIFICANT CHANGE UP (ref 4–41)
ANION GAP SERPL CALC-SCNC: 13 MMOL/L — SIGNIFICANT CHANGE UP (ref 7–14)
APTT BLD: 27.4 SEC — SIGNIFICANT CHANGE UP (ref 24.5–35.6)
AST SERPL-CCNC: 19 U/L — SIGNIFICANT CHANGE UP (ref 4–40)
BASOPHILS # BLD AUTO: 0.03 K/UL — SIGNIFICANT CHANGE UP (ref 0–0.2)
BASOPHILS NFR BLD AUTO: 0.5 % — SIGNIFICANT CHANGE UP (ref 0–2)
BILIRUB SERPL-MCNC: 0.3 MG/DL — SIGNIFICANT CHANGE UP (ref 0.2–1.2)
BUN SERPL-MCNC: 22 MG/DL — SIGNIFICANT CHANGE UP (ref 7–23)
CALCIUM SERPL-MCNC: 9.1 MG/DL — SIGNIFICANT CHANGE UP (ref 8.4–10.5)
CHLORIDE SERPL-SCNC: 102 MMOL/L — SIGNIFICANT CHANGE UP (ref 98–107)
CO2 SERPL-SCNC: 22 MMOL/L — SIGNIFICANT CHANGE UP (ref 22–31)
CREAT SERPL-MCNC: 0.94 MG/DL — SIGNIFICANT CHANGE UP (ref 0.5–1.3)
EGFR: 115 ML/MIN/1.73M2 — SIGNIFICANT CHANGE UP
EGFR: 115 ML/MIN/1.73M2 — SIGNIFICANT CHANGE UP
EOSINOPHIL # BLD AUTO: 0.08 K/UL — SIGNIFICANT CHANGE UP (ref 0–0.5)
EOSINOPHIL NFR BLD AUTO: 1.3 % — SIGNIFICANT CHANGE UP (ref 0–6)
GAS PNL BLDV: SIGNIFICANT CHANGE UP
GLUCOSE SERPL-MCNC: 88 MG/DL — SIGNIFICANT CHANGE UP (ref 70–99)
HCT VFR BLD CALC: 41.1 % — SIGNIFICANT CHANGE UP (ref 39–50)
HGB BLD-MCNC: 13.7 G/DL — SIGNIFICANT CHANGE UP (ref 13–17)
IANC: 4.69 K/UL — SIGNIFICANT CHANGE UP (ref 1.8–7.4)
IMM GRANULOCYTES NFR BLD AUTO: 0.2 % — SIGNIFICANT CHANGE UP (ref 0–0.9)
INR BLD: 1.09 RATIO — SIGNIFICANT CHANGE UP (ref 0.85–1.16)
LYMPHOCYTES # BLD AUTO: 0.93 K/UL — LOW (ref 1–3.3)
LYMPHOCYTES # BLD AUTO: 14.8 % — SIGNIFICANT CHANGE UP (ref 13–44)
MCHC RBC-ENTMCNC: 25.6 PG — LOW (ref 27–34)
MCHC RBC-ENTMCNC: 33.3 G/DL — SIGNIFICANT CHANGE UP (ref 32–36)
MCV RBC AUTO: 76.8 FL — LOW (ref 80–100)
MONOCYTES # BLD AUTO: 0.54 K/UL — SIGNIFICANT CHANGE UP (ref 0–0.9)
MONOCYTES NFR BLD AUTO: 8.6 % — SIGNIFICANT CHANGE UP (ref 2–14)
NEUTROPHILS # BLD AUTO: 4.69 K/UL — SIGNIFICANT CHANGE UP (ref 1.8–7.4)
NEUTROPHILS NFR BLD AUTO: 74.6 % — SIGNIFICANT CHANGE UP (ref 43–77)
NRBC # BLD AUTO: 0 K/UL — SIGNIFICANT CHANGE UP (ref 0–0)
NRBC # BLD: 0 /100 WBCS — SIGNIFICANT CHANGE UP (ref 0–0)
NRBC # FLD: 0 K/UL — SIGNIFICANT CHANGE UP (ref 0–0)
NRBC BLD-RTO: 0 /100 WBCS — SIGNIFICANT CHANGE UP (ref 0–0)
PLATELET # BLD AUTO: 285 K/UL — SIGNIFICANT CHANGE UP (ref 150–400)
POTASSIUM SERPL-MCNC: 4.3 MMOL/L — SIGNIFICANT CHANGE UP (ref 3.5–5.3)
POTASSIUM SERPL-SCNC: 4.3 MMOL/L — SIGNIFICANT CHANGE UP (ref 3.5–5.3)
PROT SERPL-MCNC: 6.8 G/DL — SIGNIFICANT CHANGE UP (ref 6–8.3)
PROTHROM AB SERPL-ACNC: 13 SEC — SIGNIFICANT CHANGE UP (ref 9.9–13.4)
RBC # BLD: 5.35 M/UL — SIGNIFICANT CHANGE UP (ref 4.2–5.8)
RBC # FLD: 13 % — SIGNIFICANT CHANGE UP (ref 10.3–14.5)
SODIUM SERPL-SCNC: 137 MMOL/L — SIGNIFICANT CHANGE UP (ref 135–145)
WBC # BLD: 6.28 K/UL — SIGNIFICANT CHANGE UP (ref 3.8–10.5)
WBC # FLD AUTO: 6.28 K/UL — SIGNIFICANT CHANGE UP (ref 3.8–10.5)

## 2024-12-18 PROCEDURE — 73110 X-RAY EXAM OF WRIST: CPT | Mod: 26,RT

## 2024-12-18 PROCEDURE — 93010 ELECTROCARDIOGRAM REPORT: CPT

## 2024-12-18 PROCEDURE — 99284 EMERGENCY DEPT VISIT MOD MDM: CPT

## 2024-12-18 PROCEDURE — 71046 X-RAY EXAM CHEST 2 VIEWS: CPT | Mod: 26

## 2024-12-18 PROCEDURE — 74018 RADEX ABDOMEN 1 VIEW: CPT | Mod: 26

## 2024-12-18 RX ORDER — MAGNESIUM HYDROXIDE 400 MG/5ML
30 SUSPENSION ORAL ONCE
Refills: 0 | Status: COMPLETED | OUTPATIENT
Start: 2024-12-18 | End: 2024-12-18

## 2024-12-18 RX ORDER — ACETAMINOPHEN 500 MG/5ML
1000 LIQUID (ML) ORAL ONCE
Refills: 0 | Status: COMPLETED | OUTPATIENT
Start: 2024-12-18 | End: 2024-12-18

## 2024-12-18 RX ADMIN — Medication 20 MILLIGRAM(S): at 21:00

## 2024-12-18 RX ADMIN — MAGNESIUM HYDROXIDE 30 MILLILITER(S): 400 SUSPENSION ORAL at 23:45

## 2024-12-18 RX ADMIN — Medication 2200 MILLILITER(S): at 21:08

## 2024-12-18 RX ADMIN — Medication 400 MILLIGRAM(S): at 21:07

## 2024-12-19 VITALS
HEART RATE: 90 BPM | RESPIRATION RATE: 19 BRPM | DIASTOLIC BLOOD PRESSURE: 85 MMHG | TEMPERATURE: 98 F | OXYGEN SATURATION: 98 % | SYSTOLIC BLOOD PRESSURE: 120 MMHG

## 2024-12-19 PROBLEM — F20.9 SCHIZOPHRENIA, UNSPECIFIED: Chronic | Status: ACTIVE | Noted: 2024-11-11

## 2024-12-19 PROBLEM — E78.5 HYPERLIPIDEMIA, UNSPECIFIED: Chronic | Status: ACTIVE | Noted: 2024-11-11

## 2024-12-19 LAB
APPEARANCE UR: CLEAR — SIGNIFICANT CHANGE UP
BACTERIA # UR AUTO: NEGATIVE /HPF — SIGNIFICANT CHANGE UP
BILIRUB UR-MCNC: NEGATIVE — SIGNIFICANT CHANGE UP
CAST: 0 /LPF — SIGNIFICANT CHANGE UP (ref 0–4)
COLOR SPEC: YELLOW — SIGNIFICANT CHANGE UP
DIFF PNL FLD: NEGATIVE — SIGNIFICANT CHANGE UP
FLUAV AG NPH QL: SIGNIFICANT CHANGE UP
FLUBV AG NPH QL: SIGNIFICANT CHANGE UP
GLUCOSE UR QL: NEGATIVE MG/DL — SIGNIFICANT CHANGE UP
KETONES UR-MCNC: 15 MG/DL
LEUKOCYTE ESTERASE UR-ACNC: NEGATIVE — SIGNIFICANT CHANGE UP
NITRITE UR-MCNC: NEGATIVE — SIGNIFICANT CHANGE UP
PH UR: 7 — SIGNIFICANT CHANGE UP (ref 5–8)
PROT UR-MCNC: SIGNIFICANT CHANGE UP MG/DL
RBC CASTS # UR COMP ASSIST: 0 /HPF — SIGNIFICANT CHANGE UP (ref 0–4)
RSV RNA NPH QL NAA+NON-PROBE: SIGNIFICANT CHANGE UP
SARS-COV-2 RNA SPEC QL NAA+PROBE: SIGNIFICANT CHANGE UP
SP GR SPEC: 1.04 — HIGH (ref 1–1.03)
SQUAMOUS # UR AUTO: 0 /HPF — SIGNIFICANT CHANGE UP (ref 0–5)
UROBILINOGEN FLD QL: 1 MG/DL — SIGNIFICANT CHANGE UP (ref 0.2–1)
WBC UR QL: 0 /HPF — SIGNIFICANT CHANGE UP (ref 0–5)

## 2024-12-21 ENCOUNTER — EMERGENCY (EMERGENCY)
Facility: HOSPITAL | Age: 24
LOS: 1 days | Discharge: HOME | End: 2024-12-21
Attending: EMERGENCY MEDICINE | Admitting: EMERGENCY MEDICINE
Payer: MEDICAID

## 2024-12-21 VITALS
RESPIRATION RATE: 20 BRPM | OXYGEN SATURATION: 99 % | HEIGHT: 69 IN | HEART RATE: 79 BPM | WEIGHT: 190.04 LBS | TEMPERATURE: 98 F | DIASTOLIC BLOOD PRESSURE: 91 MMHG | SYSTOLIC BLOOD PRESSURE: 136 MMHG

## 2024-12-21 VITALS
RESPIRATION RATE: 16 BRPM | TEMPERATURE: 98 F | DIASTOLIC BLOOD PRESSURE: 89 MMHG | HEART RATE: 71 BPM | SYSTOLIC BLOOD PRESSURE: 128 MMHG | OXYGEN SATURATION: 100 %

## 2024-12-21 PROCEDURE — 99284 EMERGENCY DEPT VISIT MOD MDM: CPT | Mod: 25

## 2024-12-21 NOTE — ED ADULT NURSE NOTE - OBJECTIVE STATEMENT
Pt received to  accompanied by EMS from [facility] [address] with a stated hx of schizophrenia. Pt presents pleasant, calm and cooperative and stated he has been experiencing AH/VH for "many months" and describes VH as "images of people" and AH as voices telling him things such as "don't go outside" and " watch more TV". Pt denies SI and HI; denies drug or alcohol use. Pt does not appear internally preoccupied and during RN assessment is asking about breakfast options. Pts belongings secured for safety. Pt awaiting further evaluation.

## 2024-12-21 NOTE — ED ADULT TRIAGE NOTE - CHIEF COMPLAINT QUOTE
arrives from [facility]- endorsing AH and VH x months ("pictures of people," "hearing voices"), tonight they just got louder/worse- hx schizophrenia

## 2024-12-21 NOTE — ED ADULT TRIAGE NOTE - IDEAL BODY WEIGHT(KG)
Patient called to request we send her MPS I carrier screen results and the genetic counseling note to Zuleyka at [facility] (sperm bank fax [phone]) so the sperm bank has proof of patient's negative results and genetic counseling.  The requested information was faxed.  
[phone]

## 2024-12-21 NOTE — ED ADULT NURSE REASSESSMENT NOTE - NS ED NURSE REASSESS COMMENT FT1
Pt resting in BH room, easily arousable, calm and cooperative.  A+Ox4, ambulatory. no acute distress noted, Respirations even and unlabored. pt dc'ed back to facility via taxi.

## 2024-12-21 NOTE — ED BEHAVIORAL HEALTH NOTE - BEHAVIORAL HEALTH NOTE
SW contacted by the medical team, pt cleared for discharge and needs transportation back to [place].    SW called [place], [address] ([phone]), spoke with Luisa DIAMOND who reported the patient can return via taxi.   Taxi transport arranged with [employer] ([phone]) [facility] auth #[id number], ETA 9:45a.m.    Verbal huddle regarding coordination of care with interdisciplinary team completed.

## 2024-12-21 NOTE — ED ADULT NURSE NOTE - CHIEF COMPLAINT QUOTE
arrives from [facility] [address]- endorsing AH and VH x months ("pictures of people," "hearing voices"), tonight they just got louder/worse- hx schizophrenia

## 2024-12-21 NOTE — ED PROVIDER NOTE - PATIENT PORTAL LINK FT
You can access the FollowMyHealth Patient Portal offered by [facility] by registering at the following website: [email or web address]. By joining [employer]’s FollowMyHealth portal, you will also be able to view your health information using other applications (apps) compatible with our system.

## 2024-12-21 NOTE — ED BEHAVIORAL HEALTH NOTE - BEHAVIORAL HEALTH NOTE
Pt arrives from [facility] [address] FED OF ORG. at [phone]. Writer contacted pt's residence and spoke with Alessia who provided the following information:     Staff feels pt medication regimen is no longer working for pt. Every 2-3 days pt will approach staff and say that he is hearing voice and wants to go to the hospital. Pt upon arrival of EMS will then say that he does not want to go. Staff member reports that this has been ongoing the past 3-4 months. Pt says that he is hearing voices, its loud and I want to go. Pt will then change his minds 5 minutes later. Pt said to be compliant with medication and attends a [employer] program. Pt is sleeping and eating. Pt also reported to be very pleasant. No SI/HI reported. No aggression or violence. Pt is redirectable. Mode of transportation is INDEPENDENT via public transportation or taxi.

## 2024-12-21 NOTE — ED PROVIDER NOTE - NSICDXPASTMEDICALHX_GEN_ALL_CORE_FT
PAST MEDICAL HISTORY:  Asthma     GERD (gastroesophageal reflux disease)     Hiatal hernia     HLD (hyperlipidemia)     Schizophrenia     Schizophrenia

## 2024-12-21 NOTE — ED PROVIDER NOTE - CLINICAL SUMMARY MEDICAL DECISION MAKING FREE TEXT BOX
24-year-old male past medical history of asthma, schizophrenia lives at [facility] has a history of schizophrenia.  States that last admission was in February.  Patient compliant with medications.  Patient states over the past few days he has auditory and visual hallucinations are much worse using pictures.  Will not say exactly what the pictures are off worse than normal.  He states that the voices are also telling him things but it is hard to decipher exactly what they are saying.  He denies any physical complaints at this time no SI HI.  Plan to obtain collateral from [facility] to see if patient's behavior has deteriorated warranting a psychiatric consult/psychiatric admission.    General: Well appearing, well nourished  Head: Normocephalic, atraumatic  Eyes: Conjunctiva clear  Mouth: Mucous membranes moist  Neck: Supple  Heart: Regular rate and rhythm, no murmur or gallop  Lungs: Clear to auscultation  Abdomen: soft, no tenderness  Extremities: No amputations or deformities, cyanosis, edema  Musculoskeletal: INFANTE pulses intact  Neurologic: No gross deficits   Psychiatric: appears internally preoccupied   Skin: Warm,dry.

## 2024-12-21 NOTE — ED PROVIDER NOTE - PROGRESS NOTE DETAILS
SW was able to obtain collateral from [facility].  Per them he has been compliant with his medications he is eating and drinking normally and at his baseline.  States that he does asked to go to hospital every few days for worsening auditory/visual hallucinations.  Then he quickly rescinds.  Here patient is calm and cooperative.  Therefore will observe for 1 to 2 hours with plan to reassess to see if patient can be discharged back to [facility] versus psychiatry consult YING: Pt reassessed after breakfast, pt reporting feeling safe, no HI or SI, states he continues to hear voices but cannot fully make out what they are saying now. Reports compliance w/ meds but feels he needs better meds to help stop the hallucinations. This has been an ongoing issue for him, does see a psychiatrist at this day program, encouraged f/u with his psychiatrist for med adjustment.

## 2024-12-21 NOTE — ED PROVIDER NOTE - NSFOLLOWUPINSTRUCTIONS_ED_ALL_ED_FT
[facility]   WALK-IN HOURS ARE: MONDAY TO FRIDAY, 9AM TO 3PM.  Phone  [phone]  Walk-ins can check in online before coming to minimize wait time in the office  [email or web address] Return to the ER if you feel unsafe in any way or have thoughts of wanting to harm yourself or others. Please follow up with your psychiatrist if they are away, you can speak to the psychiatrist on call.     [facility]   WALK-IN HOURS ARE: MONDAY TO FRIDAY, 9AM TO 3PM.  Phone  [phone]  Walk-ins can check in online before coming to minimize wait time in the office  [email or web address]

## 2024-12-24 LAB
CULTURE RESULTS: SIGNIFICANT CHANGE UP
CULTURE RESULTS: SIGNIFICANT CHANGE UP
SPECIMEN SOURCE: SIGNIFICANT CHANGE UP
SPECIMEN SOURCE: SIGNIFICANT CHANGE UP

## 2024-12-30 ENCOUNTER — EMERGENCY (EMERGENCY)
Facility: HOSPITAL | Age: 24
LOS: 1 days | Discharge: HOME | End: 2024-12-30
Attending: EMERGENCY MEDICINE | Admitting: EMERGENCY MEDICINE
Payer: MEDICAID

## 2024-12-30 VITALS
WEIGHT: 192.02 LBS | HEART RATE: 88 BPM | HEIGHT: 69 IN | SYSTOLIC BLOOD PRESSURE: 142 MMHG | TEMPERATURE: 99 F | OXYGEN SATURATION: 98 % | DIASTOLIC BLOOD PRESSURE: 81 MMHG | RESPIRATION RATE: 18 BRPM

## 2024-12-30 LAB
APPEARANCE UR: CLEAR — SIGNIFICANT CHANGE UP
BILIRUB UR-MCNC: NEGATIVE — SIGNIFICANT CHANGE UP
COLOR SPEC: YELLOW — SIGNIFICANT CHANGE UP
DIFF PNL FLD: NEGATIVE — SIGNIFICANT CHANGE UP
GLUCOSE UR QL: NEGATIVE MG/DL — SIGNIFICANT CHANGE UP
KETONES UR-MCNC: ABNORMAL MG/DL
LEUKOCYTE ESTERASE UR-ACNC: NEGATIVE — SIGNIFICANT CHANGE UP
NITRITE UR-MCNC: NEGATIVE — SIGNIFICANT CHANGE UP
PH UR: 7.5 — SIGNIFICANT CHANGE UP (ref 5–8)
PROT UR-MCNC: NEGATIVE MG/DL — SIGNIFICANT CHANGE UP
SP GR SPEC: 1.03 — SIGNIFICANT CHANGE UP (ref 1–1.03)
UROBILINOGEN FLD QL: 1 MG/DL — SIGNIFICANT CHANGE UP (ref 0.2–1)

## 2024-12-30 PROCEDURE — 99284 EMERGENCY DEPT VISIT MOD MDM: CPT

## 2024-12-30 PROCEDURE — 76870 US EXAM SCROTUM: CPT | Mod: 26

## 2024-12-30 RX ORDER — CLOTRIMAZOLE 10 MG/G
1 CREAM TOPICAL
Qty: 1 | Refills: 0
Start: 2024-12-30 | End: 2025-01-03

## 2024-12-30 NOTE — ED ADULT NURSE NOTE - CHIEF COMPLAINT QUOTE
pt brought in by EMS from [facility]. pt states " my private parts are hurting me" pt states he has had pain x few months. pt denies chest pain, sob, n/v/d, fever or chills.

## 2024-12-30 NOTE — ED PROVIDER NOTE - PATIENT PORTAL LINK FT
You can access the FollowMyHealth Patient Portal offered by [facility] by registering at the following website: [email or web address]. By joining [employer]’s FollowMyHealth portal, you will also be able to view your health information using other applications (apps) compatible with our system.

## 2024-12-30 NOTE — ED PROVIDER NOTE - PHYSICAL EXAMINATION
Physical Exam  GEN: Alert and oriented x 3, in no acute distress, speaking full clear sentences  HEENT: NC/AT, PERRL, EOMI, normal oropharynx  NECK: Supple, nontender, FROM  CV: RRR, no m/r/g  PULM: CTA bilat, no wheezing/rales/rhonchi  ABD: Soft, nontender, nondistended. No organomegaly  : uncircumcised, no obvious lesions or ulcers. non-tender to palpation; testicles normal lie with no swelling or edema.  EXTR: FROM to all extremities, nontender, no edema  SKIN: Warm, dry, no rash  NEURO: AOx3, speaking full clear sentences, NIFANTE 5/5 strength, ambulating with stable gait

## 2024-12-30 NOTE — PROVIDER CONTACT NOTE (OTHER) - SITUATION
Per provider, Dr Montesinos, pt is cleared for discharged and is able to return to previous residence, [address] [facility].

## 2024-12-30 NOTE — ED PROVIDER NOTE - ATTENDING CONTRIBUTION TO CARE
Pt p/w testicular pain, unremarkable exam. Pt well appearing, HDS. Plan for testicular US, STI/UTI testing.

## 2024-12-30 NOTE — ED ADULT NURSE NOTE - OBJECTIVE STATEMENT
patient alert ox4 came in c/o vaginal discharge and pain for awhile. breathing even and unlabored. NAD. labs sent as ordered. pending results. patient alert ox4 came in c/o testicular and penile pain since few weeks. denies fever/pain to sides. NAD. lab sent as ordered. awaiting US and results.

## 2024-12-30 NOTE — PROVIDER CONTACT NOTE (OTHER) - ACTION/TREATMENT ORDERED:
Transportation coordinated via [facility] with [employer] [phone] for next available - 3pm . Inv# [id number].

## 2024-12-30 NOTE — ED ADULT TRIAGE NOTE - CHIEF COMPLAINT QUOTE
pt brought in by EMS from [facility]. pt states " my private parts are hurting me" pt states he has had pain x few months. pt denies chest pain, sob, n/v/d, fever or chills.

## 2024-12-30 NOTE — PROVIDER CONTACT NOTE (OTHER) - ASSESSMENT
has spoken to Carolyn [phone].  confirmed that patients mode of transportation is taxi and that patient travels INDEPENDENTLY. Clinical provider is in agreement with [employer],  back to [place]. Verbal huddle regarding coordination of care completed with interdisciplinary team.

## 2024-12-30 NOTE — ED PROVIDER NOTE - CLINICAL SUMMARY MEDICAL DECISION MAKING FREE TEXT BOX
25 y/o M with PMH schizophrenia p/w penile/testicular pain. Patient afebrile, hemodynamically stable, otherwise well-appearing. No obvious lesions or ulcerations on penile shaft. Patient endorsing mild intermittent testicular pain but exam wnl. No history of trauma, no dysuria/hematuria/discharge. Denies sexual activity. Will obtian UA, testicular ultrasound to r/o torsion vs. epididymitis, G/C. Will trial pain control, anticipate d/c back to [facility] with his established urology f/u.

## 2024-12-30 NOTE — ED PROVIDER NOTE - PROGRESS NOTE DETAILS
No torsion on testicular ultrasound. UA wnl. Will callback with G/C results. Patient's pain improved with tylenol. Counseled patient on f/u to PMD and urology and return precautions. Spoke with SW for d/c back to [facility].

## 2024-12-30 NOTE — ED PROVIDER NOTE - NSFOLLOWUPINSTRUCTIONS_ED_ALL_ED_FT
Please follow-up with your outpatient urologist and PMD.   Take Tylenol 650mg every 6 hours as needed for pain.  Return to ED if you have significantly worsening pain, burning with urination.

## 2024-12-30 NOTE — ED PROVIDER NOTE - OBJECTIVE STATEMENT
25 y/o M with PMH asthma, schizophrenia, resides at [facility], p/w penile and testicular pain for the last few weeks. Patient states he has been having penile pain and intermittent testicular pain. He saw a urologist as an outpatient and is awaiting urine testing results. States he was prescribed a topical ointment for the penis but was not able to obtain the medication due to insurance issues. No fevers, dysuria, hematuria, back pain, abd pain, nvd. States he has not been sexually active in many years.

## 2024-12-30 NOTE — ED ADULT NURSE NOTE - NSFALLUNIVINTERV_ED_ALL_ED
Bed/Stretcher in lowest position, wheels locked, appropriate side rails in place/Call bell, personal items and telephone in reach/Instruct patient to call for assistance before getting out of bed/chair/stretcher/Non-slip footwear applied when patient is off stretcher/[place] to call system/Physically safe environment - no spills, clutter or unnecessary equipment/Purposeful proactive rounding/Room/bathroom lighting operational, light cord in reach

## 2024-12-31 LAB
C TRACH RRNA SPEC QL NAA+PROBE: SIGNIFICANT CHANGE UP
N GONORRHOEA RRNA SPEC QL NAA+PROBE: SIGNIFICANT CHANGE UP
SPECIMEN SOURCE: SIGNIFICANT CHANGE UP

## 2025-01-24 ENCOUNTER — EMERGENCY (EMERGENCY)
Facility: HOSPITAL | Age: 25
LOS: 1 days | Discharge: HOME | End: 2025-01-24
Attending: STUDENT IN AN ORGANIZED HEALTH CARE EDUCATION/TRAINING PROGRAM | Admitting: STUDENT IN AN ORGANIZED HEALTH CARE EDUCATION/TRAINING PROGRAM
Payer: MEDICAID

## 2025-01-24 VITALS
DIASTOLIC BLOOD PRESSURE: 79 MMHG | RESPIRATION RATE: 17 BRPM | WEIGHT: 195.11 LBS | OXYGEN SATURATION: 97 % | SYSTOLIC BLOOD PRESSURE: 128 MMHG | TEMPERATURE: 98 F | HEART RATE: 109 BPM | HEIGHT: 69 IN

## 2025-01-24 LAB
ALBUMIN SERPL ELPH-MCNC: 4.4 G/DL — SIGNIFICANT CHANGE UP (ref 3.3–5)
ALP SERPL-CCNC: 45 U/L — SIGNIFICANT CHANGE UP (ref 40–120)
ALT FLD-CCNC: 17 U/L — SIGNIFICANT CHANGE UP (ref 4–41)
ANION GAP SERPL CALC-SCNC: 13 MMOL/L — SIGNIFICANT CHANGE UP (ref 7–14)
AST SERPL-CCNC: 19 U/L — SIGNIFICANT CHANGE UP (ref 4–40)
BASOPHILS # BLD AUTO: 0.05 K/UL — SIGNIFICANT CHANGE UP (ref 0–0.2)
BASOPHILS NFR BLD AUTO: 0.6 % — SIGNIFICANT CHANGE UP (ref 0–2)
BILIRUB SERPL-MCNC: 0.4 MG/DL — SIGNIFICANT CHANGE UP (ref 0.2–1.2)
BUN SERPL-MCNC: 13 MG/DL — SIGNIFICANT CHANGE UP (ref 7–23)
CALCIUM SERPL-MCNC: 9.4 MG/DL — SIGNIFICANT CHANGE UP (ref 8.4–10.5)
CHLORIDE SERPL-SCNC: 102 MMOL/L — SIGNIFICANT CHANGE UP (ref 98–107)
CO2 SERPL-SCNC: 23 MMOL/L — SIGNIFICANT CHANGE UP (ref 22–31)
CREAT SERPL-MCNC: 0.98 MG/DL — SIGNIFICANT CHANGE UP (ref 0.5–1.3)
EGFR: 109 ML/MIN/1.73M2 — SIGNIFICANT CHANGE UP
EOSINOPHIL # BLD AUTO: 0.03 K/UL — SIGNIFICANT CHANGE UP (ref 0–0.5)
EOSINOPHIL NFR BLD AUTO: 0.4 % — SIGNIFICANT CHANGE UP (ref 0–6)
GLUCOSE SERPL-MCNC: 93 MG/DL — SIGNIFICANT CHANGE UP (ref 70–99)
HCT VFR BLD CALC: 42.4 % — SIGNIFICANT CHANGE UP (ref 39–50)
HGB BLD-MCNC: 13.6 G/DL — SIGNIFICANT CHANGE UP (ref 13–17)
IANC: 6.55 K/UL — SIGNIFICANT CHANGE UP (ref 1.8–7.4)
IMM GRANULOCYTES NFR BLD AUTO: 0.7 % — SIGNIFICANT CHANGE UP (ref 0–0.9)
LIDOCAIN IGE QN: 24 U/L — SIGNIFICANT CHANGE UP (ref 7–60)
LYMPHOCYTES # BLD AUTO: 1.04 K/UL — SIGNIFICANT CHANGE UP (ref 1–3.3)
LYMPHOCYTES # BLD AUTO: 12.3 % — LOW (ref 13–44)
MAGNESIUM SERPL-MCNC: 1.8 MG/DL — SIGNIFICANT CHANGE UP (ref 1.6–2.6)
MCHC RBC-ENTMCNC: 25.2 PG — LOW (ref 27–34)
MCHC RBC-ENTMCNC: 32.1 G/DL — SIGNIFICANT CHANGE UP (ref 32–36)
MCV RBC AUTO: 78.5 FL — LOW (ref 80–100)
MONOCYTES # BLD AUTO: 0.74 K/UL — SIGNIFICANT CHANGE UP (ref 0–0.9)
MONOCYTES NFR BLD AUTO: 8.7 % — SIGNIFICANT CHANGE UP (ref 2–14)
NEUTROPHILS # BLD AUTO: 6.55 K/UL — SIGNIFICANT CHANGE UP (ref 1.8–7.4)
NEUTROPHILS NFR BLD AUTO: 77.3 % — HIGH (ref 43–77)
NRBC # BLD: 0 /100 WBCS — SIGNIFICANT CHANGE UP (ref 0–0)
NRBC # FLD: 0 K/UL — SIGNIFICANT CHANGE UP (ref 0–0)
PHOSPHATE SERPL-MCNC: 3 MG/DL — SIGNIFICANT CHANGE UP (ref 2.5–4.5)
PLATELET # BLD AUTO: 247 K/UL — SIGNIFICANT CHANGE UP (ref 150–400)
POTASSIUM SERPL-MCNC: 4 MMOL/L — SIGNIFICANT CHANGE UP (ref 3.5–5.3)
POTASSIUM SERPL-SCNC: 4 MMOL/L — SIGNIFICANT CHANGE UP (ref 3.5–5.3)
PROT SERPL-MCNC: 7.2 G/DL — SIGNIFICANT CHANGE UP (ref 6–8.3)
RBC # BLD: 5.4 M/UL — SIGNIFICANT CHANGE UP (ref 4.2–5.8)
RBC # FLD: 13.1 % — SIGNIFICANT CHANGE UP (ref 10.3–14.5)
SODIUM SERPL-SCNC: 138 MMOL/L — SIGNIFICANT CHANGE UP (ref 135–145)
WBC # BLD: 8.47 K/UL — SIGNIFICANT CHANGE UP (ref 3.8–10.5)
WBC # FLD AUTO: 8.47 K/UL — SIGNIFICANT CHANGE UP (ref 3.8–10.5)

## 2025-01-24 PROCEDURE — 99284 EMERGENCY DEPT VISIT MOD MDM: CPT

## 2025-01-24 RX ORDER — ACETAMINOPHEN 80 MG/.8ML
1000 SOLUTION/ DROPS ORAL ONCE
Refills: 0 | Status: COMPLETED | OUTPATIENT
Start: 2025-01-24 | End: 2025-01-24

## 2025-01-24 RX ORDER — SODIUM CHLORIDE 9 MG/ML
1000 INJECTION, SOLUTION INTRAMUSCULAR; INTRAVENOUS; SUBCUTANEOUS ONCE
Refills: 0 | Status: COMPLETED | OUTPATIENT
Start: 2025-01-24 | End: 2025-01-24

## 2025-01-24 RX ORDER — MECLIZINE HYDROCHLORIDE 25 MG/1
25 TABLET ORAL ONCE
Refills: 0 | Status: COMPLETED | OUTPATIENT
Start: 2025-01-24 | End: 2025-01-24

## 2025-01-24 RX ADMIN — SODIUM CHLORIDE 1000 MILLILITER(S): 9 INJECTION, SOLUTION INTRAMUSCULAR; INTRAVENOUS; SUBCUTANEOUS at 16:58

## 2025-01-24 RX ADMIN — ACETAMINOPHEN 400 MILLIGRAM(S): 80 SOLUTION/ DROPS ORAL at 16:53

## 2025-01-24 RX ADMIN — ACETAMINOPHEN 1000 MILLIGRAM(S): 80 SOLUTION/ DROPS ORAL at 17:08

## 2025-01-24 RX ADMIN — ACETAMINOPHEN 1000 MILLIGRAM(S): 80 SOLUTION/ DROPS ORAL at 17:23

## 2025-01-24 RX ADMIN — SODIUM CHLORIDE 1000 MILLILITER(S): 9 INJECTION, SOLUTION INTRAMUSCULAR; INTRAVENOUS; SUBCUTANEOUS at 17:58

## 2025-01-24 RX ADMIN — MECLIZINE HYDROCHLORIDE 25 MILLIGRAM(S): 25 TABLET ORAL at 17:28

## 2025-01-24 NOTE — ED PROVIDER NOTE - OBJECTIVE STATEMENT
24-year-old male past medical history of hypertension, hyperlipidemia, asthma, schizophrenia from [facility] presents to the ED with complaints of headache that started this morning.  Reports that he was going to program pain noted that he had a gradual onset of headache reports that the headache wraps in the front of his head and feels pounding.  Reports that he has had this headache in the past but has never this bad.  Denies any nausea, vomiting or any changes in vision. Patient also reports that he has not eaten since he had some soup and pizza 6 hours ago and that he feels lightheaded.  Also states that he has some abdominal bloating and feels gassy. Denies any fevers, chills, chest pain, shortness of breath, abdominal pain, dysuria.

## 2025-01-24 NOTE — ED ADULT TRIAGE NOTE - BANDS:
NEW PHARMACY REQUIRES NEW RX:    Med: lisinopril  Dosage: 20MG  Si tablet by mouth daily  Quantity requested:  90    Med: pravastatin  Dosage: 40G  Si tablet by mouth daily  Quantity requested:  90     Med: duloxetine  Dosage: 30MG  Si capsule by mouth daily    Quantity requested:  30 - R3      Mail Order: no    Preferred pharmacy has been set up and verified.      [facility]   F [phone]     Name band;

## 2025-01-24 NOTE — PROVIDER CONTACT NOTE (OTHER) - ASSESSMENT
Spoke with staff member, Verena, who reports that pt is independent in travel and can take a taxi.  Discussed with provider, who is aware and in agreement with taxi transport.  SW went to discuss with pt, however pt left the ED.  BRIANA called residence again and informed Verena-she reports that pt does travel independently and is OK to get back to residence on his own.  Verbal huddle complete.

## 2025-01-24 NOTE — ED PROVIDER NOTE - NSFOLLOWUPINSTRUCTIONS_ED_ALL_ED_FT
Headache    A headache is pain or discomfort felt around the head or neck area. The specific cause of a headache may not be found as there are many types including tension headaches, migraine headaches, and cluster headaches. Watch your condition for any changes. Things you can do to manage your pain include taking over the counter and prescription medications as instructed by your health care provider, lying down in a dark quiet room, limiting stress, getting regular sleep, and refraining from alcohol and tobacco products.    SEEK IMMEDIATE MEDICAL CARE IF YOU HAVE ANY OF THE FOLLOWING SYMPTOMS: fever, vomiting, stiff neck, loss of vision, problems with speech, muscle weakness, loss of balance, trouble walking, passing out, or confusion.    Please follow up with opthalmology for your blurry vision.

## 2025-01-24 NOTE — ED PROVIDER NOTE - NS ED MD DISPO DISCHARGE CCDA
Telephone Encounter by Chioma Ryna at 06/15/18 03:25 PM     Author:  Chioma Ryan Service:  (none) Author Type:  Certified Nursing Assistant     Filed:  06/15/18 03:26 PM Encounter Date:  6/15/2018 Status:  Signed     :  Chioma Ryan (Certified Nursing Assistant)              ANGELITA RUBY    Patient Age: 17 year old    ACCT STATUS: NO COPAY  MESSAGE:[GS1.1T]   Patient called stated he needs to schedule 2 day follow up to change the bandages next available 6-22-18 please advise[GS1.1M]    Electronically Signed by:    Chioma Ryan , 6/15/2018[GS1.2T]     Next and Last Visit with Provider and Department  Next visit with JEREL CARMONA is on 06/28/2018 at  9:15 AM in SURGERY CO[facility]  Next visit with GENERAL SURGERY is on 06/28/2018 at  9:15 AM in SURGERY [facility]  Last visit with JEREL CARMONA was on 06/11/2018 at  1:30 PM in SURGERY HLD  Last visit with GENERAL SURGERY was on 06/11/2018 at  1:30 PM in SURGERY HLD     WEIGHT AND HEIGHT: As of 06/11/2018 weight is 175 lbs.(79.379 kg). Height is 6' 0\"(1.829 m).   BMI is 23.73 kg/(m^2) calculated from:     Height 6' 0\" (1.829 m) as of 6/11/18     Weight 175 lb (79.379 kg) as of 6/11/18      No Known Allergies  No current outpatient prescriptions on file.      PHARMACY to use:           Pharmacy preference(s) on file: [address]    CALL BACK INFO:[GS1.1T] Ok to leave response (including medical information) on answering machine[GS1.1M]  ROUTING:[GS1.1T] Patient's physician/staff[GS1.1M]        PCP: Luis Enrique Olivarez MD         INS: Payor: [employer] / Plan: *No Plan* / Product Type: *No Product type* / Note: This is the primary coverage, but no account was found for this location or the patient's primary location.   ADDRESS:  [address] [place] [address] [address]  [facility] [id number][GS1.1T]         Revision History        User Key Date/Time User Provider Type Action    > GS1.2 06/15/18 03:26 PM Shelby  Chioma Certified Nursing Assistant Sign     GS1.1 06/15/18 03:25 PM Chioma Ryan Certified Nursing Assistant     M - Manual, T - Template             Patient/Caregiver provided printed discharge information.

## 2025-01-24 NOTE — ED PROVIDER NOTE - PHYSICAL EXAMINATION
Ernesto Guevara DO (PGY1)   Physical Exam:    Gen: NAD, AOx3  Head: NCAT  HEENT: EOMI, PEERLA  Lung: CTAB, no respiratory distress, no wheezes/rhonchi/rales B/L  CV: RRR, no murmurs, rubs or gallops  Abd: soft, NT, ND, no guarding, no rigidity, no rebound tenderness, no CVA tenderness   MSK: no visible deformities, ROM normal in UE/LE, no back pain  Neuro: No focal sensory or motor deficits. Sensation intact to light touch all extremities. CN II-XII intact  Skin: Warm, well perfused, no rash, no leg swelling  Psych: normal affect, calm

## 2025-01-24 NOTE — ED ADULT NURSE NOTE - CHIEF COMPLAINT QUOTE
pt arrives from [facility] c/o frontal headache x1.5 days. denies dizziness, blurry vision, nausea, vomiting, chest pain. 5/5 upper and lower extremity strength, ambulatory with steady gait, facial features symmetric.   hx schizophrenia, HLD, asthma

## 2025-01-24 NOTE — ED PROVIDER NOTE - CLINICAL SUMMARY MEDICAL DECISION MAKING FREE TEXT BOX
24-year-old male past medical history of hypertension, hyperlipidemia, asthma, schizophrenia from [facility] presents to the ED with complaints of headache that started this morning. Gradual onset, pulsating around the frontal bone and some in the back of the head. PE shows that patient is neurovascularly intact, CNII-XII, PEERL and no obvious facial trauma or neurological deficits. Low concern for subarachnoid given patients age and lack of risk factors, as well as gradual onset of symptoms. Will treat headache with tylenol, will rehydrate and reassess.

## 2025-01-24 NOTE — ED ADULT NURSE NOTE - OBJECTIVE STATEMENT
Break shift RN: Pt received to 10a presents from day program for c/c headache and abd discomfort described as "gas." Pt a&ox4, ambulatory at baseline skin intact respirations even and unlabored abd soft and non-distended nontender to palpation. denies chest pain, fever, chills, n/v, dizziness, focal deficits. resident at bedside assessing pt, will await orders and continue to monitor.

## 2025-01-24 NOTE — ED PROVIDER NOTE - ATTENDING CONTRIBUTION TO CARE
23 yo M hx schizophrenia, HTN, HLD, pre-diabetes, asthma, presenting with complaints of headache and dizziness with nausea that he noted upon waking up this morning. Dizziness is intermittent and described as room spinning. No reported numbness/tingling/weakness.     VITALS: reviewed  GEN: NAD, A & O x 4  HEAD/EYES: NCAT, EOMI, anicteric sclerae,   ENT: mucus membranes moist, oropharynx WNL, trachea midline,  RESP: lungs CTA with equal breath sounds bilaterally, chest wall nontender and atraumatic  CV: heart with reg rhythm S1, S2, distal pulses intact and symmetric bilaterally  ABDOMEN: soft, nondistended, nontender, no palpable masses  : no CVAT  MSK: extremities atraumatic and nontender, no edema, no asymmetry.  SKIN: warm, dry, no rash, no bruising, no cyanosis. color appropriate for ethnicity  NEURO: alert, mentating appropriately, no facial asymmetry.   PSYCH: Affect appropriate    No red flag symptoms, pt neuro intact. Hx similar headaches in the past. Plan labs, meds, fluids and reassess.    Pt with improved sx after tx. DC with return recs. Appreciate sw coordinating transportation.

## 2025-01-24 NOTE — ED PROVIDER NOTE - NSFOLLOWUPCLINICS_GEN_ALL_ED_FT
[facility] - Ophthalmology  Ophthalmology  [address]  [place] [address]  Phone: [phone]  Fax:

## 2025-01-24 NOTE — ED ADULT TRIAGE NOTE - CHIEF COMPLAINT QUOTE
pt arrives from [facility] c/o frontal headache x1.5 days. denies dizziness, blurry vision, nausea, vomiting, chest pain. 5/5 upper and lower extremity strength, ambulatory with steady gait, facial features symmetric.   hx schizophrenia, HLD, asthma

## 2025-01-24 NOTE — ED PROVIDER NOTE - PROGRESS NOTE DETAILS
Patient states that his headache is completely gone and would like to go home now. Labs WNL. Will arrange for transport back to [facility].

## 2025-01-24 NOTE — ED PROVIDER NOTE - PATIENT PORTAL LINK FT
You can access the FollowMyHealth Patient Portal offered by [facility] by registering at the following website: [email or web address]. By joining [email or web address]’s FollowMyHealth portal, you will also be able to view your health information using other applications (apps) compatible with our system.

## 2025-02-05 ENCOUNTER — EMERGENCY (EMERGENCY)
Facility: HOSPITAL | Age: 25
LOS: 1 days | Discharge: HOME | End: 2025-02-05
Attending: EMERGENCY MEDICINE | Admitting: EMERGENCY MEDICINE
Payer: MEDICAID

## 2025-02-05 VITALS
RESPIRATION RATE: 18 BRPM | OXYGEN SATURATION: 98 % | HEART RATE: 63 BPM | SYSTOLIC BLOOD PRESSURE: 119 MMHG | TEMPERATURE: 98 F | DIASTOLIC BLOOD PRESSURE: 78 MMHG

## 2025-02-05 VITALS
TEMPERATURE: 98 F | WEIGHT: 177.91 LBS | DIASTOLIC BLOOD PRESSURE: 84 MMHG | RESPIRATION RATE: 18 BRPM | HEART RATE: 88 BPM | OXYGEN SATURATION: 98 % | SYSTOLIC BLOOD PRESSURE: 126 MMHG | HEIGHT: 69 IN

## 2025-02-05 LAB
ALBUMIN SERPL ELPH-MCNC: 4.1 G/DL — SIGNIFICANT CHANGE UP (ref 3.3–5)
ALP SERPL-CCNC: 46 U/L — SIGNIFICANT CHANGE UP (ref 40–120)
ALT FLD-CCNC: 14 U/L — SIGNIFICANT CHANGE UP (ref 4–41)
ANION GAP SERPL CALC-SCNC: 10 MMOL/L — SIGNIFICANT CHANGE UP (ref 7–14)
APPEARANCE UR: CLEAR — SIGNIFICANT CHANGE UP
AST SERPL-CCNC: 20 U/L — SIGNIFICANT CHANGE UP (ref 4–40)
BASOPHILS # BLD AUTO: 0.05 K/UL — SIGNIFICANT CHANGE UP (ref 0–0.2)
BASOPHILS NFR BLD AUTO: 1.2 % — SIGNIFICANT CHANGE UP (ref 0–2)
BILIRUB SERPL-MCNC: <0.2 MG/DL — SIGNIFICANT CHANGE UP (ref 0.2–1.2)
BILIRUB UR-MCNC: NEGATIVE — SIGNIFICANT CHANGE UP
BLD GP AB SCN SERPL QL: NEGATIVE — SIGNIFICANT CHANGE UP
BUN SERPL-MCNC: 20 MG/DL — SIGNIFICANT CHANGE UP (ref 7–23)
CALCIUM SERPL-MCNC: 9.7 MG/DL — SIGNIFICANT CHANGE UP (ref 8.4–10.5)
CHLORIDE SERPL-SCNC: 106 MMOL/L — SIGNIFICANT CHANGE UP (ref 98–107)
CO2 SERPL-SCNC: 24 MMOL/L — SIGNIFICANT CHANGE UP (ref 22–31)
COLOR SPEC: YELLOW — SIGNIFICANT CHANGE UP
CREAT SERPL-MCNC: 0.91 MG/DL — SIGNIFICANT CHANGE UP (ref 0.5–1.3)
DIFF PNL FLD: NEGATIVE — SIGNIFICANT CHANGE UP
EGFR: 119 ML/MIN/1.73M2 — SIGNIFICANT CHANGE UP
EOSINOPHIL # BLD AUTO: 0.16 K/UL — SIGNIFICANT CHANGE UP (ref 0–0.5)
EOSINOPHIL NFR BLD AUTO: 3.8 % — SIGNIFICANT CHANGE UP (ref 0–6)
GLUCOSE SERPL-MCNC: 83 MG/DL — SIGNIFICANT CHANGE UP (ref 70–99)
GLUCOSE UR QL: NEGATIVE MG/DL — SIGNIFICANT CHANGE UP
HCT VFR BLD CALC: 39.8 % — SIGNIFICANT CHANGE UP (ref 39–50)
HGB BLD-MCNC: 12.7 G/DL — LOW (ref 13–17)
IANC: 2.15 K/UL — SIGNIFICANT CHANGE UP (ref 1.8–7.4)
IMM GRANULOCYTES NFR BLD AUTO: 0.5 % — SIGNIFICANT CHANGE UP (ref 0–0.9)
KETONES UR-MCNC: NEGATIVE MG/DL — SIGNIFICANT CHANGE UP
LEUKOCYTE ESTERASE UR-ACNC: NEGATIVE — SIGNIFICANT CHANGE UP
LIDOCAIN IGE QN: 38 U/L — SIGNIFICANT CHANGE UP (ref 7–60)
LYMPHOCYTES # BLD AUTO: 1.55 K/UL — SIGNIFICANT CHANGE UP (ref 1–3.3)
LYMPHOCYTES # BLD AUTO: 36.9 % — SIGNIFICANT CHANGE UP (ref 13–44)
MCHC RBC-ENTMCNC: 24.9 PG — LOW (ref 27–34)
MCHC RBC-ENTMCNC: 31.9 G/DL — LOW (ref 32–36)
MCV RBC AUTO: 78 FL — LOW (ref 80–100)
MONOCYTES # BLD AUTO: 0.27 K/UL — SIGNIFICANT CHANGE UP (ref 0–0.9)
MONOCYTES NFR BLD AUTO: 6.4 % — SIGNIFICANT CHANGE UP (ref 2–14)
NEUTROPHILS # BLD AUTO: 2.15 K/UL — SIGNIFICANT CHANGE UP (ref 1.8–7.4)
NEUTROPHILS NFR BLD AUTO: 51.2 % — SIGNIFICANT CHANGE UP (ref 43–77)
NITRITE UR-MCNC: NEGATIVE — SIGNIFICANT CHANGE UP
NRBC # BLD AUTO: 0 K/UL — SIGNIFICANT CHANGE UP (ref 0–0)
NRBC # BLD: 0 /100 WBCS — SIGNIFICANT CHANGE UP (ref 0–0)
NRBC # FLD: 0 K/UL — SIGNIFICANT CHANGE UP (ref 0–0)
NRBC BLD-RTO: 0 /100 WBCS — SIGNIFICANT CHANGE UP (ref 0–0)
PH UR: 6.5 — SIGNIFICANT CHANGE UP (ref 5–8)
PLATELET # BLD AUTO: 302 K/UL — SIGNIFICANT CHANGE UP (ref 150–400)
POTASSIUM SERPL-MCNC: 4.3 MMOL/L — SIGNIFICANT CHANGE UP (ref 3.5–5.3)
POTASSIUM SERPL-SCNC: 4.3 MMOL/L — SIGNIFICANT CHANGE UP (ref 3.5–5.3)
PROT SERPL-MCNC: 7 G/DL — SIGNIFICANT CHANGE UP (ref 6–8.3)
PROT UR-MCNC: SIGNIFICANT CHANGE UP MG/DL
RBC # BLD: 5.1 M/UL — SIGNIFICANT CHANGE UP (ref 4.2–5.8)
RBC # FLD: 12.8 % — SIGNIFICANT CHANGE UP (ref 10.3–14.5)
RH IG SCN BLD-IMP: POSITIVE — SIGNIFICANT CHANGE UP
SODIUM SERPL-SCNC: 140 MMOL/L — SIGNIFICANT CHANGE UP (ref 135–145)
SP GR SPEC: 1.03 — SIGNIFICANT CHANGE UP (ref 1–1.03)
UROBILINOGEN FLD QL: 1 MG/DL — SIGNIFICANT CHANGE UP (ref 0.2–1)
WBC # BLD: 4.2 K/UL — SIGNIFICANT CHANGE UP (ref 3.8–10.5)
WBC # FLD AUTO: 4.2 K/UL — SIGNIFICANT CHANGE UP (ref 3.8–10.5)

## 2025-02-05 PROCEDURE — 99284 EMERGENCY DEPT VISIT MOD MDM: CPT

## 2025-02-05 PROCEDURE — 71046 X-RAY EXAM CHEST 2 VIEWS: CPT | Mod: 26

## 2025-02-05 PROCEDURE — 74177 CT ABD & PELVIS W/CONTRAST: CPT | Mod: 26

## 2025-02-05 PROCEDURE — 76705 ECHO EXAM OF ABDOMEN: CPT | Mod: 26

## 2025-02-05 RX ORDER — ACETAMINOPHEN 160 MG/5ML
1000 SUSPENSION ORAL ONCE
Refills: 0 | Status: COMPLETED | OUTPATIENT
Start: 2025-02-05 | End: 2025-02-05

## 2025-02-05 RX ADMIN — ACETAMINOPHEN 400 MILLIGRAM(S): 160 SUSPENSION ORAL at 10:55

## 2025-02-05 NOTE — ED PROVIDER NOTE - OBJECTIVE STATEMENT
23 yo M with PMHx HTN, HLP, pre-Dm, schizophrenia here with diffuse abdominal pain and feeling constipation but passing flatus for the past 2 days. The pt says that he had 1 episode of hard bowel movement,. He was sent from [facility]. + nausea. No f/c, sob, cp, diarrhea.

## 2025-02-05 NOTE — ED PROVIDER NOTE - PATIENT PORTAL LINK FT
You can access the FollowMyHealth Patient Portal offered by [facility] by registering at the following website: [email or web address]. By joining [email or web address]’s FollowMyHealth portal, you will also be able to view your health information using other applications (apps) compatible with our system.

## 2025-02-05 NOTE — ED PROVIDER NOTE - CLINICAL SUMMARY MEDICAL DECISION MAKING FREE TEXT BOX
The pt is tender in the RLQ so high suspicion for appendicitis. Will check pre-surgical labs and CT abdomen/pelvis and if negative will dc with instructions for a bowel regimen for constipation.

## 2025-02-05 NOTE — ED ADULT NURSE NOTE - OBJECTIVE STATEMENT
Pt. A&OX4, c/o right sided abdominal pain starting this AM. Denies nausea, vomiting, diarrhea or fevers. No urinary symptoms. Pain has been persistent since starting. hx of schizophrenia, HTN and HLD. Resides at [facility]. #20g IV placed to left AC, labs sent as ordered. Vitals stable, breathing well on RA. Respirations even and unlabored. Will continue to monitor.

## 2025-02-05 NOTE — PROVIDER CONTACT NOTE (OTHER) - SITUATION
Per provider, patient is cleared for discharge and is able to return to previous facility, [facility] [address] [facility] [place].

## 2025-02-05 NOTE — ED PROVIDER NOTE - NSFOLLOWUPINSTRUCTIONS_ED_ALL_ED_FT
Advance activity as tolerated.  Continue all previously prescribed medications as directed unless otherwise instructed.  Follow up with your primary care physician in 48-72 hours- bring copies of your results.  Return to the ER for worsening or persistent symptoms, and/or ANY NEW OR CONCERNING SYMPTOMS. If you have issues obtaining follow up, please call: [phone] to obtain a doctor or specialist who takes your insurance in your area.  You may call [phone] to make an appointment with the internal medicine clinic.

## 2025-02-05 NOTE — PROVIDER CONTACT NOTE (OTHER) - ASSESSMENT
has spoken to Carolyne [phone].  confirmed that patients mode of transportation is taxi and that patient travels INDEPENDENTLY. Clinical provider is in agreement with [employer], back to [place]. Verbal huddle regarding coordination of care completed with interdisciplinary team.

## 2025-02-05 NOTE — ED ADULT TRIAGE NOTE - CHIEF COMPLAINT QUOTE
c/o diffused abd pain and feeling constipation, onset this morning , but reports had 1 episode of hard bowel movement, from [facility], Phx of Schizophrenia.

## 2025-02-06 LAB
CULTURE RESULTS: SIGNIFICANT CHANGE UP
SPECIMEN SOURCE: SIGNIFICANT CHANGE UP

## 2025-02-06 NOTE — ED ADULT NURSE NOTE - HISTORY OF COVID-19 VACCINATION
Occupational Therapy    OT Treatment    Patient Name: Ga Camp  MRN: [id number]  Today's Date: 2/6/2025     Time Calculation  Start Time: 1115  Stop Time: 1200  Time Calculation (min): 45 min    Visit Number: 12  Therapeutic Procedures:      OT Therapeutic Procedures Time Entry  Manual Therapy Time Entry: 30  Therapeutic Exercise Time Entry: 15                         Current Problem:  1. Left wrist fracture  Follow Up In Occupational Therapy      2. Closed fracture dislocation of right elbow  Follow Up In Occupational Therapy          Subjective     General: Pt reported he went back to work, pt reported no increase pain. Pt DC from PT today. Pt reported [facility] [place] [facility].     Reason for Referral: 1. Left wrist fracture  Referral to Occupational Therapy    2. Closed fracture dislocation of right elbow  Referred By: Adrian Eid Comment: Visit 10  Pain:  Pain Assessment  Pain Assessment: 0-10  0-10 (Numeric) Pain Score: 0 - No pain  Pain Location: Pelvis  Pain Orientation: Right, Left  Pain 2  Pain Score 2: 3  Pain Location 2: Elbow  Pain Orientation 2: Right  Pain Descriptors 2: Aching  Clinical Progression 2: Gradually improving    Objective       Therapy/Activity:   Modalities  Modalities Used: Yes  Modality 1: Untimed Hotpack, Untimed Other: (comment)     Manual Therapy  Manual Therapy Activity 1: Cupping to Dorsal and radial regions of L wrist  Manual Therapy Activity 2: STM to L wrist and R elbow regions  Manual Therapy Activity 3: STM to RUE (hand, forearm and bicep)  Therapeutic Exercise  Therapeutic Exercise Activity 1: PROM over ball with 2 lbs 1 min hold x 3sets  Therapeutic Exercise Activity 2: RUE PROM elbow ext, 2 lbs weight in hand and elbow over ball, 3 sets of 2 mins stretch    OP EDUCATION:   Cont HEP     Assessment:   Pt participated in therapeutic exercises and activities as needed to increase ROM. Ot completed cupping on LUE dorsal wrist and radial side. Pt tolerated  Cupping and STM. Pt completed AROM over ball without complaints of pain. Pt reported at end of session pain was at a 2/10 due to use and manual therapy.       Plan:  OT Plan: TE,TA, Modalities  Duration: 8 weeks  Onset Date: 12/12/24  Certification Period Start Date: 12/12/24  Certification Period End Date: 02/06/25  Rehab Potential: Fair  Plan of Care Agreement: Patient  Goals:  Active       OT Goals       Develop and issue HEP to help maximize ROM, strength and tolerance to help maximize return to all pre-onset activities.        Start:  12/12/24    Expected End:  03/06/25            LTG - Patient will indicate/ demonstrate the ability to resume all preinjury ADLs and IADLs without significant limits secondary to decreased ROM, decreased strength and/or pain as indicated by Quickdash score of less than 20%.        Start:  12/12/24    Expected End:  03/06/25            Patient will demonstrate a progressive increase in ROM as appropriate with BUE to be within 5-10 degrees of age norms to help patient resume normal ADL and IADL function.        Start:  12/12/24    Expected End:  03/06/25            Pain to be less than or equal to 1/10 with greater than or equal to 45 minutes activity.        Start:  12/12/24    Expected End:  03/06/25            Pt will demonstrate increased  strength, when appropriate, with BUE  to be greater than or equal to 80% of the norms to help patient resume ADLs and IADLs.        Start:  12/12/24    Expected End:  03/06/25                     Vaccine status unknown

## 2025-02-09 ENCOUNTER — EMERGENCY (EMERGENCY)
Facility: HOSPITAL | Age: 25
LOS: 1 days | Discharge: HOME | End: 2025-02-09
Attending: EMERGENCY MEDICINE | Admitting: EMERGENCY MEDICINE
Payer: MEDICAID

## 2025-02-09 VITALS
HEIGHT: 69 IN | HEART RATE: 93 BPM | DIASTOLIC BLOOD PRESSURE: 83 MMHG | TEMPERATURE: 98 F | SYSTOLIC BLOOD PRESSURE: 129 MMHG | RESPIRATION RATE: 16 BRPM | OXYGEN SATURATION: 98 %

## 2025-02-09 VITALS
SYSTOLIC BLOOD PRESSURE: 103 MMHG | RESPIRATION RATE: 16 BRPM | DIASTOLIC BLOOD PRESSURE: 60 MMHG | OXYGEN SATURATION: 97 % | HEART RATE: 76 BPM | TEMPERATURE: 98 F

## 2025-02-09 PROCEDURE — 99284 EMERGENCY DEPT VISIT MOD MDM: CPT | Mod: 25

## 2025-02-09 RX ORDER — HALOPERIDOL 10 MG/1
5 TABLET ORAL ONCE
Refills: 0 | Status: COMPLETED | OUTPATIENT
Start: 2025-02-09 | End: 2025-02-09

## 2025-02-09 RX ADMIN — HALOPERIDOL 5 MILLIGRAM(S): 10 TABLET ORAL at 00:52

## 2025-02-09 NOTE — ED ADULT NURSE NOTE - CHIEF COMPLAINT
The patient is a 24y Male complaining of hallucinations. Manual Repair Warning Statement: We plan on removing the manually selected variable below in favor of our much easier automatic structured text blocks found in the previous tab. We decided to do this to help make the flow better and give you the full power of structured data. Manual selection is never going to be ideal in our platform and I would encourage you to avoid using manual selection from this point on, especially since I will be sunsetting this feature. It is important that you do one of two things with the customized text below. First, you can save all of the text in a word file so you can have it for future reference. Second, transfer the text to the appropriate area in the Library tab. Lastly, if there is a flap or graft type which we do not have you need to let us know right away so I can add it in before the variable is hidden. No need to panic, we plan to give you roughly 6 months to make the change.

## 2025-02-09 NOTE — ED ADULT NURSE NOTE - ED STAT RN HANDOFF DETAILS
Patient offered and accepted breakfast. Patient picked up by taxi arranged by . Patient has personal belongings.  PAULA Mcnally

## 2025-02-09 NOTE — ED ADULT NURSE NOTE - OBJECTIVE STATEMENT
Pt received to  accompanied by EMS from [facility] [address]. Per EMS, pt has a hx of schizophrenia and MR and asked staff to call 911 because he is hearing voices telling him to hurt himself. Pt presents calm and cooperative, does not appear internally preoccupied and admits he heard a voice earlier telling him to hurt himself but endorses that he has no feelings or intentions of doing so. Pt denies HI, denies drug or alcohol use. Pts belongings secured for safety. Pt awaiting further evaluation.

## 2025-02-09 NOTE — ED PROVIDER NOTE - CLINICAL SUMMARY MEDICAL DECISION MAKING FREE TEXT BOX
23 y/o M with h/o GERD, asthma, schizophrenia here with AH - hearing voices telling him to hurt himself.  He is calm and cooperative here, in good behavioral control, does not appear acutely psychotic or internally preoccupied, and with no signs of clinical intoxication.  Will offer PO meds, reassess in AM, poss psych consult if sxs do not improve with meds.

## 2025-02-09 NOTE — ED ADULT TRIAGE NOTE - CHIEF COMPLAINT QUOTE
Pt arrives to ED via EMS from [place] [address] reporting he is hearing voices since this morning and seeing people as visual hallucinations.  Pt reports voices are telling him to hurt himself but he does not feel S/I.  Pt denies H/I as well.  Hx: Schizophrenia and intellectual disability.

## 2025-02-09 NOTE — ED ADULT NURSE REASSESSMENT NOTE - NS ED NURSE REASSESS COMMENT FT1
Reassessment vitals as noted; pt reports AH have stopped and is awaiting SW for transportation back to [facility].

## 2025-02-09 NOTE — ED PROVIDER NOTE - PATIENT PORTAL LINK FT
You can access the FollowMyHealth Patient Portal offered by [facility] by registering at the following website: [email or web address]. By joining [employer]’s FollowMyHealth portal, you will also be able to view your health information using other applications (apps) compatible with our system.

## 2025-02-09 NOTE — ED ADULT NURSE REASSESSMENT NOTE - NS ED NURSE REASSESS COMMENT FT1
RN Rounding Note 3:50am- Pt observed sleeping; respirations even and non labored. Pt awaiting MD reassessment in AM.

## 2025-02-09 NOTE — ED ADULT NURSE NOTE - CHIEF COMPLAINT QUOTE
Pt arrives to ED via EMS from [place] [address] reporting he is hearing voices since this morning and seeing people as visual hallucinations.  Pt reports voices are telling him to hurt himself but he does not feel S/I.  Pt denies H/I as well.  Hx: Schizophrenia and intellectual disability.

## 2025-02-09 NOTE — ED PROVIDER NOTE - CONSTITUTIONAL, MLM
Araceli Mcadams MD sent to [facility]  Nurse Msg Pool             Please refill MTX        
MED - MTX and LEF  Last Refill 10-9-19  Last OV 10-9-19  FUV/RTC  6-22-20  Last Lab 10-8-19 and 10-28-19. Due for lab.  Will have lab completed this weekend. Pls advise on refill. He has missed 1 dose so far.        
No lab completed yet. Pharmacy notified. MTX refilled 1-16-20  
Notified of lab results.  
- - -

## 2025-02-09 NOTE — ED BEHAVIORAL HEALTH NOTE - BEHAVIORAL HEALTH NOTE
SW contacted by the medical team, pt cleared for discharge and needs taxi transport back to [place].    SW called [place], [facility] [address] ([phone]) and spoke to Kusum who confirmed the pt travels independently and can return via taxi by himself to [address] [place] [address], [address].    Taxi transport was arranged with [facility]a ([phone]),auth #[id number] from [facility] portal, ETA 9:00a.m.     Verbal huddle regarding coordination of care with interdisciplinary completed.

## 2025-02-09 NOTE — ED PROVIDER NOTE - OBJECTIVE STATEMENT
23 y/o M with h/o GERD, asthma, schizophrenia here with hallucinations.  Pt reports that since the morning he has been hearing voices telling him to hurt himself.  Pt denies any SA/SI/HI.  No drug or ETOH use.  Pt reports adherence to medications; last haldol dec approx 1 month prior.

## 2025-02-09 NOTE — ED PROVIDER NOTE - PROGRESS NOTE DETAILS
Dillon CHAVEZ: Pt reassessed - he reports feeling better, no longer hearing voices.  Pt states he is ready to return to his residence and is requesting breakfast.  Pt is stable for discharge, awaiting SW in AM to assist with discharge back to [place] residence.

## 2025-02-09 NOTE — ED PROVIDER NOTE - NSFOLLOWUPINSTRUCTIONS_ED_ALL_ED_FT
Take your home medications as prescribed.  Follow-up with your psychiatrist as needed.  Return to the emergency department for any new or worsening symptoms.

## 2025-02-12 ENCOUNTER — EMERGENCY (EMERGENCY)
Facility: HOSPITAL | Age: 25
LOS: 1 days | Discharge: HOME | End: 2025-02-12
Attending: STUDENT IN AN ORGANIZED HEALTH CARE EDUCATION/TRAINING PROGRAM | Admitting: STUDENT IN AN ORGANIZED HEALTH CARE EDUCATION/TRAINING PROGRAM
Payer: MEDICAID

## 2025-02-12 VITALS
RESPIRATION RATE: 17 BRPM | HEART RATE: 90 BPM | TEMPERATURE: 98 F | HEIGHT: 69 IN | SYSTOLIC BLOOD PRESSURE: 128 MMHG | OXYGEN SATURATION: 97 % | DIASTOLIC BLOOD PRESSURE: 77 MMHG | WEIGHT: 294.1 LBS

## 2025-02-12 VITALS
RESPIRATION RATE: 18 BRPM | OXYGEN SATURATION: 97 % | TEMPERATURE: 97 F | SYSTOLIC BLOOD PRESSURE: 115 MMHG | HEART RATE: 72 BPM | DIASTOLIC BLOOD PRESSURE: 81 MMHG

## 2025-02-12 LAB
APPEARANCE UR: CLEAR — SIGNIFICANT CHANGE UP
BILIRUB UR-MCNC: NEGATIVE — SIGNIFICANT CHANGE UP
COLOR SPEC: YELLOW — SIGNIFICANT CHANGE UP
DIFF PNL FLD: NEGATIVE — SIGNIFICANT CHANGE UP
GLUCOSE UR QL: NEGATIVE MG/DL — SIGNIFICANT CHANGE UP
HIV 1+2 AB+HIV1 P24 AG SERPL QL IA: SIGNIFICANT CHANGE UP
KETONES UR-MCNC: NEGATIVE MG/DL — SIGNIFICANT CHANGE UP
LEUKOCYTE ESTERASE UR-ACNC: NEGATIVE — SIGNIFICANT CHANGE UP
NITRITE UR-MCNC: NEGATIVE — SIGNIFICANT CHANGE UP
PH UR: 7.5 — SIGNIFICANT CHANGE UP (ref 5–8)
PROT UR-MCNC: NEGATIVE MG/DL — SIGNIFICANT CHANGE UP
SP GR SPEC: 1.02 — SIGNIFICANT CHANGE UP (ref 1–1.03)
UROBILINOGEN FLD QL: 0.2 MG/DL — SIGNIFICANT CHANGE UP (ref 0.2–1)

## 2025-02-12 PROCEDURE — 99284 EMERGENCY DEPT VISIT MOD MDM: CPT

## 2025-02-12 PROCEDURE — 93010 ELECTROCARDIOGRAM REPORT: CPT

## 2025-02-12 RX ORDER — ONDANSETRON 4 MG/1
4 TABLET, ORALLY DISINTEGRATING ORAL ONCE
Refills: 0 | Status: COMPLETED | OUTPATIENT
Start: 2025-02-12 | End: 2025-02-12

## 2025-02-12 RX ADMIN — ONDANSETRON 4 MILLIGRAM(S): 4 TABLET, ORALLY DISINTEGRATING ORAL at 11:07

## 2025-02-12 NOTE — ED ADULT NURSE NOTE - CHIEF COMPLAINT QUOTE
pt from [facility], c/o genital pain x 1 month with dizziness and headache, states went to bed 9pm, woke up at 3am. pt ambulatory with steady gait to triage. MD park to triage, no stroke code called.

## 2025-02-12 NOTE — ED PROVIDER NOTE - ATTENDING APP SHARED VISIT CONTRIBUTION OF CARE
The patient presented to the ED with penile pain and headache as above, no red flags, ambulatory, neuro intact, abdomen soft, non-tender, non-distended,  offered sti screening, accepted, stable for discharge if urinalysis negative. sw to coordinated dispo home to [facility].

## 2025-02-12 NOTE — ED ADULT NURSE NOTE - SUICIDE SCREENING QUESTION 3
Home Care received a Referral for Physical Therapy and Occupational Therapy. We have processed the referral for a Start of Care on 24-48 hrs.     If you have any questions or concerns, please feel free to contact us at [phone]. Follow the prompts, enter your five digit zip code, and you will be directed to your care team on PEDS.    
No

## 2025-02-12 NOTE — ED ADULT NURSE NOTE - OBJECTIVE STATEMENT
Pt. A&OX4, brought to rm 16 for evaluation of penile pain and swelling. On exam with HERNAN Abbasi, no redness, swelling or lesions noted. Denies any discharge or difficulty urinating. No known fevers. hx of schizophrenia and HTN. States he's compliant with all medications. Urine to be collected as ordered. Vitals stable, breathing well on RA. Respirations even and unlabored. Will continue to monitor.

## 2025-02-12 NOTE — ED ADULT NURSE REASSESSMENT NOTE - NS ED NURSE REASSESS COMMENT FT1
Pt. lying in bed comfortably with no complaints at this time. Vitals stable and documented. Will continue to monitor.

## 2025-02-12 NOTE — ED PROVIDER NOTE - OBJECTIVE STATEMENT
25 y/o male pmh schizophrenia c/o headache and penile pain. Pt states that he woke up today with a headache but it has resolved already. Pt also c/o penile pain or several months. Admits to urinary frequency but states that is chronic and not new. Pt states that he saw a urologist several months ago and had a normal urine test. Pt denies being sexually active and denies hx of STIs. Denies chest pain, sob, abd pain, n/v/d, penile discharge, testicular pain or swelling, numbness, weakness, fever or chills.

## 2025-02-12 NOTE — ED PROVIDER NOTE - CLINICAL SUMMARY MEDICAL DECISION MAKING FREE TEXT BOX
25 y/o male pmh schizophrenia c/o headache and penile pain. Headache has resolved since coming to the hospital. Pt still c/o penile pain. Denies being sexually active, denies hx of stis. Pt is well appearing, nad, afebrile, no murmur, lungs cta b/l, abd soft nt nd, normal  exam, non circumcised, no lesions or discharge, no neuro deficits- will send off UA/Ucx, STI screening, HIV/Hep C, dc.

## 2025-02-12 NOTE — ED ADULT NURSE NOTE - NSFALLUNIVINTERV_ED_ALL_ED
Bed/Stretcher in lowest position, wheels locked, appropriate side rails in place/Call bell, personal items and telephone in reach/Instruct patient to call for assistance before getting out of bed/chair/stretcher/Non-slip footwear applied when patient is off stretcher/[place] to call system/Physically safe environment - no spills, clutter or unnecessary equipment/Purposeful proactive rounding/Room/bathroom lighting operational, light cord in reach

## 2025-02-12 NOTE — ED PROVIDER NOTE - GENITOURINARY BLADDER
John is a 68 year old who is being evaluated via a billable video visit.      How would you like to obtain your AVS? [email or web address]  If the video visit is dropped, the invitation should be resent by: Send to e-mail at: [email or web address]  Will anyone else be joining your video visit? No    Video-Visit Details    Video Start Time: 1306    Type of service:  Video Visit    Video End Time:1319    Originating Location (pt. Location): Home    Distant Location (provider location):  [facility] [place]     Platform used for Video Visit: Well      TRANSPLANT NEPHROLOGY CHRONIC POST TRANSPLANT VISIT    Assessment & Plan   # LDKT: Stable creatinine at new baseline ~ 2.0-2.3 with multiple MARCOS episodes due to past diarrheal illnesses.  Kidney transplant biopsy 6/2022 showed no acute rejection with some transplant glomerulopathy and moderate chronic changes.   - Baseline Creatinine:  ~ 2.0-2.3   - Proteinuria: Mild (0.5-1.0 grams)   - Date DSA Last Checked: Jun/2022      Latest DSA: No   - BK Viremia: No   - Kidney Tx Biopsy: Jun 03, 2022; Result: No diagnostic evidence of acute rejection.  Mild transplant glomerulopathy with some potential features of thrombotic microangiopathy.  Moderate interstitial fibrosis and tubular atrophy.  Severe vascular changes.             Oct 19, 2021; Result: No diagnostic evidence of acute rejection.  Acute tubular injury with mild interstitial fibrosis and tubular atrophy.    # Immunosuppression: Tacrolimus immediate release (goal 4-6) and Azathioprine (dose 100 mg daily)   - Continue with intensive monitoring of immunosuppression for efficacy and toxicity.   - Changes: No    # Infection Prophylaxis:   Last CD4 Level: 165 (Sep/2021)  - PJP: Sulfa/TMP (Bactrim)    # Hypertension: Not checked recently;  Goal BP: < 130/80   - Changes: Not at this time; Recommend patient check blood pressure at home on a regular basis, such as once every week or two, and follow up with PCP  if above goal.    # Anemia in Chronic Renal Disease: Hgb: Stable      LULY: No   - Iron studies: Low iron saturation    # Mineral Bone Disorder:   - Vitamin D; level: Low        On supplement: Yes  - Calcium; level: Normal        On supplement: No    # CAD, s/p PCI: Asymptomatic.    # Chronic Diarrhea: Patient was previously diagnosed with Clostridium difficile and also cryptosporidium.  He was treated for both, but no improvement in his ongoing diarrhea symptoms.  He has been seen by Transplant ID and GI.  Patient was then changed from mycophenolate to azathioprine and symptoms slowly improved over time.  Now with no diarrhea and regular bowel movements.    # EBV Viremia: Trend down in EBV PCR to ~ 9K with last check 1/2022.   - Will repeat EBV PCR.    # Skin Cancer: New lesions: none   - Discussed sun protection and recommend regular follow up with Dermatology.    # Medical Compliance: Yes    # COVID-19 Virus Review: Discussed COVID-19 virus and the potential medical risks.  Reviewed preventative health recommendations, including wearing a mask where appropriate.  Recommended COVID vaccination should be up to date with either an initial vaccination or booster shot when appropriate.  Asked the patient to inform the transplant center if they are exposed or diagnosed with this virus.    # COVID Vaccination Up To Date: Yes    # Transplant History:  Etiology of Kidney Failure: Membranous nephropathy ([place])  Tx: LDKT  Transplant: 1/21/2016 (Kidney)  Significant changes in immunosuppression: Changes off mycophenolate to azathioprine due to diarrhea.  Significant transplant-related complications: None    Transplant Office Phone Number: [phone]    Assessment and plan was discussed with the patient and he voiced his understanding and agreement.    Return visit: Return in about 6 months (around 3/19/2023).    Samuel Varela MD     Chief Complaint   Mr. Vee is a 68 year old here for kidney transplant and  immunosuppression management.    History of Present Illness    Mr. Vee reports feeling good overall with some medical complaints.  Since last clinic visit, patient reports no hospitalizations or new medical complaints and has been doing well overall.  His energy level is good and now back to normal.  He is active and does get some exercise.  Denies any chest pain or shortness of breath with exertion.  No leg swelling.    Appetite is good and weight is stable.  No nausea, vomiting or diarrhea.  He is having formed bowel movements.  He does report occasional tongue pain or change.  No white covering and not sure what it is from.  No fever, sweats or chills.  Intermittent, mild night sweats.     Home BP: Not checked.     Problem List   Patient Active Problem List   Diagnosis     HTN, kidney transplant related     Membranous glomerulonephritis     Anemia in chronic renal disease     Secondary renal hyperparathyroidism (H)     Vitamin D deficiency     Dyslipidemia     Anxiety     Status post coronary angiogram     CAD, multiple vessel     Multiple vessel coronary artery disease     Kidney replaced by transplant     Immunosuppression (H)     Aftercare following organ transplant     Closed fracture of trochanter of right femur, initial encounter (H)     Impaired fasting glucose     Erectile dysfunction     Old myocardial infarction     Diarrhea     Prophylactic antibiotic     Chronic kidney disease, stage 3b (H)     Need for pneumocystis prophylaxis       Allergies   No Known Allergies    Medications   Current Outpatient Medications   Medication Sig     aspirin 81 MG EC tablet Take 81 mg by mouth every morning     azaTHIOprine (IMURAN) 50 MG tablet Take 2 tablets (100 mg) by mouth daily     carvedilol (COREG) 25 MG tablet Take 1 tablet (25 mg) by mouth every morning In addition to 25 mg every evening.     PROGRAF (BRAND) 1 MG/ML suspension Take 0.4 mLs (0.4 mg) by mouth 2 times daily     sulfamethoxazole-trimethoprim  (BACTRIM) 400-80 MG tablet Take 1 tablet by mouth Every Mon, Wed, Fri Morning     vitamin D3 (CHOLECALCIFEROL) 50 mcg (2000 units) tablet Take 1 tablet by mouth every morning     No current facility-administered medications for this visit.     Medications Discontinued During This Encounter   Medication Reason     ferrous sulfate (FEROSUL) 325 (65 Fe) MG tablet      ondansetron (ZOFRAN-ODT) 4 MG ODT tab      sildenafil (VIAGRA) 100 MG tablet        Physical Exam   Vital Signs: Wt 68 kg (150 lb)   BMI 21.52 kg/m      GENERAL APPEARANCE: alert and no distress  HENT: no obvious abnormalities on appearance  RESP: breathing appears unremarkable with normal rate, no audible wheezing or cough and no apparent shortness of breath with conversation  MS: extremities normal - no gross deformities noted  SKIN: no apparent rash and normal skin tone  NEURO: speech is clear with no obvious neurological deficits  PSYCH: mentation appears normal and affect normal    Data     Renal Latest Ref Rng & Units 8/12/2022 6/3/2022 5/27/2022   Na 133 - 144 mmol/L 135 138 136   Na (external) 136 - 145 meq/L - - -   K 3.4 - 5.3 mmol/L 4.3 4.5 4.5   K (external) 3.5 - 5.1 meq/L - - -   Cl 94 - 109 mmol/L 108 109 107   CO2 20 - 32 mmol/L 25 26 25   CO2 (external) 22 - 31 meq/L - - -   BUN 7 - 30 mg/dL 40(H) 36(H) 29   BUN (external) 6 - 22 mg/dL - - -   Cr 0.66 - 1.25 mg/dL 2.22(H) 2.19(H) 2.10(H)   Cr (external) 0.70 - 1.30 mg/dL - - -   Glucose 70 - 99 mg/dL 98 96 93   Glucose (external) 70 - 99 mg/dL - - -   Ca  8.5 - 10.1 mg/dL 9.0 8.9 9.0   Ca (external) 8.5 - 10.5 mg/dL - - -   Mg 1.6 - 2.3 mg/dL - - -     Bone Health Latest Ref Rng & Units 12/24/2021 12/16/2021 8/7/2021   Phos 2.5 - 4.5 mg/dL - 2.8 2.7   PTHi 12 - 72 pg/mL - - -   Vit D Def 20 - 75 ug/L 26 - -     Heme Latest Ref Rng & Units 8/12/2022 6/3/2022 5/27/2022   WBC 4.0 - 11.0 10e3/uL 5.0 4.0 4.1   WBC (external) 4.0 - 11.0 10*9/L - - -   Hgb 13.3 - 17.7 g/dL 10.1(L) 9.8(L) 9.6(L)    Hgb (external) 13.3 - 17.7 g/dL - - -   Plt 150 - 450 10e3/uL 192 185 181   Plt (external) 150 - 450 10*9/L - - -   ABSOLUTE NEUTROPHIL 1.6 - 8.3 10e9/L - - -   ABSOLUTE LYMPHOCYTES 0.8 - 5.3 10e9/L - - -   ABSOLUTE MONOCYTES 0.0 - 1.3 10e9/L - - -   ABSOLUTE EOSINOPHILS 0.0 - 0.7 10e9/L - - -   ABSOLUTE BASOPHILS 0.0 - 0.2 10e9/L - - -   ABS IMMATURE GRANULOCYTES 0 - 0.4 10e9/L - - -   ABSOLUTE NUCLEATED RBC - - - -     Liver Latest Ref Rng & Units 12/16/2021 8/7/2021 12/9/2020   AP 40 - 150 U/L - 83 -   TBili 0.2 - 1.3 mg/dL - 0.8 -   DBili 0.0 - 0.2 mg/dL - - -   ALT 0 - 70 U/L - 17 -   AST 0 - 45 U/L - 10 -   Tot Protein 6.8 - 8.8 g/dL - 7.5 -   Albumin 3.4 - 5.0 g/dL 3.6 3.7 2.8(L)     Pancreas Latest Ref Rng & Units 5/6/2021   A1C 0 - 5.6 % 5.7(H)     Iron studies Latest Ref Rng & Units 12/24/2021 12/16/2021 1/28/2016   Iron 35 - 180 ug/dL 44 54 62   Iron sat 15 - 46 % 24 - 34   Ferritin 26 - 388 ng/mL 255 - 787(H)     P Txp Virology Latest Ref Rng & Units 1/19/2022 12/16/2021 8/7/2021   CVM DNA Quant - - - -   CMV QUANT IU/ML Not Detected IU/mL - Not Detected Not Detected   LOG IU/ML OF CMVQNT <2.1 [Log:IU]/mL - - -   BK Spec - - - -   BK Res BKNEG:BK Virus DNA Not Detected copies/mL - - -   BK Log <2.7 Log copies/mL - - -   EBV CAPSID ANTIBODY IGG 0.0 - 0.8 AI - - -   EBV DNA LOG OF COPIES - 3.9 4.4 -   Hep B Core NR - - -        Recent Labs   Lab Test 05/13/22  0939 05/27/22  1107 08/12/22  1002   DOSTAC 5/12/2022 5/26/2022 8/11/2022   TACROL 5.3 4.1* 5.8     Recent Labs   Lab Test 04/25/16  0826 05/03/16  0853 08/09/21  1043   DOSMPA 4.24.2016 2030 2,030 8/8/2021   8:30 PM   MPACID 4.01* 3.81* 0.81*   MPAG 46.6 38.6 48.0      non-tender/non-distended

## 2025-02-12 NOTE — ED PROVIDER NOTE - PATIENT PORTAL LINK FT
You can access the FollowMyHealth Patient Portal offered by [facility] by registering at the following website: [email or web address]. By joining [facility]’s FollowMyHealth portal, you will also be able to view your health information using other applications (apps) compatible with our system.

## 2025-02-12 NOTE — ED ADULT TRIAGE NOTE - CHIEF COMPLAINT QUOTE
pt from [facility], c/o genital pain x 1 month with dizziness and headache, states went to bed 9pm, woke up at 3am. pt ambulatory with steady gait to triage. MD park to triage, no stroke code called.

## 2025-02-13 LAB
C TRACH RRNA SPEC QL NAA+PROBE: SIGNIFICANT CHANGE UP
HCV AB S/CO SERPL IA: 0.06 S/CO — SIGNIFICANT CHANGE UP (ref 0–0.99)
HCV AB SERPL-IMP: SIGNIFICANT CHANGE UP
N GONORRHOEA RRNA SPEC QL NAA+PROBE: SIGNIFICANT CHANGE UP
SPECIMEN SOURCE: SIGNIFICANT CHANGE UP

## 2025-02-17 NOTE — PROVIDER CONTACT NOTE (OTHER) - RECOMMENDATIONS
Hub staff attempted to follow warm transfer process and was unsuccessful     Caller: CHRISTINA ENCINAS    Relationship to patient: DAUGHTER     Best call back number: [phone]     Patient is needing: PT HAS BEEN IN HOSPITAL, DOUBLE PHENOMNIA, HAS BEENT AKING PEN EVERY TWO WEEKS AND HOSPITAL BELIEVES THATS WHY SHE GOT THE PNEUMONIA. PT IS HEARING AND SEEING THINGS THAT ARENT THERE, SHOULD PT TAKE NEXT INJECTION? PLEASE CALL DAUGHTER BACK.          Update-pt was in waiting room - SW arranged a taxi for him to go home.  Informed Verena from residence; she is aware of his return via txai.

## 2025-02-23 ENCOUNTER — EMERGENCY (EMERGENCY)
Facility: HOSPITAL | Age: 25
LOS: 1 days | Discharge: NURSING, REHAB OR LONG-TERM CARE | End: 2025-02-23
Attending: STUDENT IN AN ORGANIZED HEALTH CARE EDUCATION/TRAINING PROGRAM | Admitting: STUDENT IN AN ORGANIZED HEALTH CARE EDUCATION/TRAINING PROGRAM
Payer: MEDICAID

## 2025-02-23 VITALS
HEART RATE: 80 BPM | SYSTOLIC BLOOD PRESSURE: 128 MMHG | HEIGHT: 69 IN | RESPIRATION RATE: 16 BRPM | TEMPERATURE: 98 F | DIASTOLIC BLOOD PRESSURE: 85 MMHG | WEIGHT: 194.01 LBS | OXYGEN SATURATION: 100 %

## 2025-02-23 VITALS
HEART RATE: 73 BPM | TEMPERATURE: 98 F | RESPIRATION RATE: 16 BRPM | OXYGEN SATURATION: 100 % | DIASTOLIC BLOOD PRESSURE: 72 MMHG | SYSTOLIC BLOOD PRESSURE: 115 MMHG

## 2025-02-23 LAB
ALBUMIN SERPL ELPH-MCNC: 3.9 G/DL — SIGNIFICANT CHANGE UP (ref 3.3–5)
ALP SERPL-CCNC: 39 U/L — LOW (ref 40–120)
ALT FLD-CCNC: 11 U/L — SIGNIFICANT CHANGE UP (ref 4–41)
ANION GAP SERPL CALC-SCNC: 11 MMOL/L — SIGNIFICANT CHANGE UP (ref 7–14)
AST SERPL-CCNC: 14 U/L — SIGNIFICANT CHANGE UP (ref 4–40)
BASOPHILS # BLD AUTO: 0.04 K/UL — SIGNIFICANT CHANGE UP (ref 0–0.2)
BASOPHILS NFR BLD AUTO: 0.9 % — SIGNIFICANT CHANGE UP (ref 0–2)
BILIRUB SERPL-MCNC: 0.4 MG/DL — SIGNIFICANT CHANGE UP (ref 0.2–1.2)
BUN SERPL-MCNC: 13 MG/DL — SIGNIFICANT CHANGE UP (ref 7–23)
CALCIUM SERPL-MCNC: 9 MG/DL — SIGNIFICANT CHANGE UP (ref 8.4–10.5)
CHLORIDE SERPL-SCNC: 103 MMOL/L — SIGNIFICANT CHANGE UP (ref 98–107)
CO2 SERPL-SCNC: 25 MMOL/L — SIGNIFICANT CHANGE UP (ref 22–31)
CREAT SERPL-MCNC: 1.06 MG/DL — SIGNIFICANT CHANGE UP (ref 0.5–1.3)
EGFR: 99 ML/MIN/1.73M2 — SIGNIFICANT CHANGE UP
EOSINOPHIL # BLD AUTO: 0.05 K/UL — SIGNIFICANT CHANGE UP (ref 0–0.5)
EOSINOPHIL NFR BLD AUTO: 1.1 % — SIGNIFICANT CHANGE UP (ref 0–6)
GLUCOSE SERPL-MCNC: 79 MG/DL — SIGNIFICANT CHANGE UP (ref 70–99)
HCT VFR BLD CALC: 40.2 % — SIGNIFICANT CHANGE UP (ref 39–50)
HGB BLD-MCNC: 12.7 G/DL — LOW (ref 13–17)
IANC: 2.24 K/UL — SIGNIFICANT CHANGE UP (ref 1.8–7.4)
IMM GRANULOCYTES NFR BLD AUTO: 0.2 % — SIGNIFICANT CHANGE UP (ref 0–0.9)
LIDOCAIN IGE QN: 30 U/L — SIGNIFICANT CHANGE UP (ref 7–60)
LYMPHOCYTES # BLD AUTO: 1.5 K/UL — SIGNIFICANT CHANGE UP (ref 1–3.3)
LYMPHOCYTES # BLD AUTO: 33.3 % — SIGNIFICANT CHANGE UP (ref 13–44)
MCHC RBC-ENTMCNC: 24.9 PG — LOW (ref 27–34)
MCHC RBC-ENTMCNC: 31.6 G/DL — LOW (ref 32–36)
MCV RBC AUTO: 78.7 FL — LOW (ref 80–100)
MONOCYTES # BLD AUTO: 0.66 K/UL — SIGNIFICANT CHANGE UP (ref 0–0.9)
MONOCYTES NFR BLD AUTO: 14.7 % — HIGH (ref 2–14)
NEUTROPHILS # BLD AUTO: 2.24 K/UL — SIGNIFICANT CHANGE UP (ref 1.8–7.4)
NEUTROPHILS NFR BLD AUTO: 49.8 % — SIGNIFICANT CHANGE UP (ref 43–77)
NRBC # BLD AUTO: 0 K/UL — SIGNIFICANT CHANGE UP (ref 0–0)
NRBC # FLD: 0 K/UL — SIGNIFICANT CHANGE UP (ref 0–0)
NRBC BLD AUTO-RTO: 0 /100 WBCS — SIGNIFICANT CHANGE UP (ref 0–0)
PLATELET # BLD AUTO: 210 K/UL — SIGNIFICANT CHANGE UP (ref 150–400)
POTASSIUM SERPL-MCNC: 3.7 MMOL/L — SIGNIFICANT CHANGE UP (ref 3.5–5.3)
POTASSIUM SERPL-SCNC: 3.7 MMOL/L — SIGNIFICANT CHANGE UP (ref 3.5–5.3)
PROT SERPL-MCNC: 6.9 G/DL — SIGNIFICANT CHANGE UP (ref 6–8.3)
RBC # BLD: 5.11 M/UL — SIGNIFICANT CHANGE UP (ref 4.2–5.8)
RBC # FLD: 13.1 % — SIGNIFICANT CHANGE UP (ref 10.3–14.5)
SODIUM SERPL-SCNC: 139 MMOL/L — SIGNIFICANT CHANGE UP (ref 135–145)
WBC # BLD: 4.5 K/UL — SIGNIFICANT CHANGE UP (ref 3.8–10.5)
WBC # FLD AUTO: 4.5 K/UL — SIGNIFICANT CHANGE UP (ref 3.8–10.5)

## 2025-02-23 PROCEDURE — 99284 EMERGENCY DEPT VISIT MOD MDM: CPT

## 2025-02-23 RX ORDER — ONDANSETRON 4 MG/1
4 TABLET, ORALLY DISINTEGRATING ORAL ONCE
Refills: 0 | Status: COMPLETED | OUTPATIENT
Start: 2025-02-23 | End: 2025-02-23

## 2025-02-23 RX ORDER — ACETAMINOPHEN 160 MG/5ML
1000 SUSPENSION ORAL ONCE
Refills: 0 | Status: COMPLETED | OUTPATIENT
Start: 2025-02-23 | End: 2025-02-23

## 2025-02-23 RX ORDER — BACTERIOSTATIC SODIUM CHLORIDE 0.9 %
1000 VIAL (ML) INJECTION ONCE
Refills: 0 | Status: COMPLETED | OUTPATIENT
Start: 2025-02-23 | End: 2025-02-23

## 2025-02-23 RX ORDER — FAMOTIDINE 10 MG/ML
20 INJECTION INTRAVENOUS ONCE
Refills: 0 | Status: COMPLETED | OUTPATIENT
Start: 2025-02-23 | End: 2025-02-23

## 2025-02-23 RX ADMIN — ONDANSETRON 4 MILLIGRAM(S): 4 TABLET, ORALLY DISINTEGRATING ORAL at 10:06

## 2025-02-23 RX ADMIN — FAMOTIDINE 20 MILLIGRAM(S): 10 INJECTION INTRAVENOUS at 10:05

## 2025-02-23 RX ADMIN — ACETAMINOPHEN 400 MILLIGRAM(S): 160 SUSPENSION ORAL at 10:06

## 2025-02-23 RX ADMIN — Medication 1000 MILLILITER(S): at 10:06

## 2025-02-23 NOTE — ED ADULT NURSE NOTE - CHIEF COMPLAINT QUOTE
Coming from [facility] [address] c/o upper abd discomfort associated with nausea and one episode of vomiting beginning last night. Endorsing dizziness and headache. Denies fever, chills, sob, cough, chest pain. Pmhx: htn, hld, schizophrenia, asthma

## 2025-02-23 NOTE — ED PROVIDER NOTE - CLINICAL SUMMARY MEDICAL DECISION MAKING FREE TEXT BOX
25 yo male with epigastric abd pain a/w with NBNB N/V.   gastritis?   pt also endorsing constipation, however passing gas. low suspicion for SBO  will obtain labs, symptomatic tx

## 2025-02-23 NOTE — ED ADULT NURSE NOTE - OBJECTIVE STATEMENT
pt received to room #29 with c/o non-specific abd pain with 1 episode of vomiting since last night. also states his eyes hurt when he moves them and he feels constipated. last BM yesterday morning.  pt aox4. abd soft, non tender, non distended. pt aox4. Denies cp, sob and visual changes. pt seen by PA. pending attending eval.

## 2025-02-23 NOTE — ED PROVIDER NOTE - PROGRESS NOTE DETAILS
Pt reassessed at bedside, feels well, pain controlled. Informed of workup in ED, reviewed lab and/or radiology results (when applicable) with patient/caregiver. Informed pt of plan for discharge with instructions to follow up with PMD. Pt/caregiver expressed understanding of plan and agrees with plan for discharge. Strict return precautions discussed with patient in layman's terms, patient demonstrated understanding of return precautions. Alma Light PA-C

## 2025-02-23 NOTE — ED PROVIDER NOTE - INTERNATIONAL TRAVEL
[General Appearance - Well Developed] : well developed [General Appearance - Well Nourished] : well nourished [Normal Appearance] : normal appearance [Well Groomed] : well groomed [General Appearance - In No Acute Distress] : no acute distress [Abdomen Soft] : soft [Abdomen Tenderness] : non-tender [Costovertebral Angle Tenderness] : no ~M costovertebral angle tenderness [Urethral Meatus] : meatus normal [Penis Abnormality] : normal circumcised penis [Urinary Bladder Findings] : the bladder was normal on palpation [Scrotum] : the scrotum was normal [Testes Mass (___cm)] : there were no testicular masses [Prostate Size ___ (0-4)] : prostate size [unfilled] (scale: 0-4) [FreeTextEntry1] : firm area left apex No [Edema] : no peripheral edema [] : no respiratory distress [Respiration, Rhythm And Depth] : normal respiratory rhythm and effort [Exaggerated Use Of Accessory Muscles For Inspiration] : no accessory muscle use [Oriented To Time, Place, And Person] : oriented to person, place, and time [Affect] : the affect was normal [Mood] : the mood was normal [Not Anxious] : not anxious [Normal Station and Gait] : the gait and station were normal for the patient's age [No Focal Deficits] : no focal deficits [No Palpable Adenopathy] : no palpable adenopathy

## 2025-02-23 NOTE — ED ADULT NURSE REASSESSMENT NOTE - NS ED NURSE REASSESS COMMENT FT1
pt tolerated PO challenge. requesting d/c. PA aware, SW called for transport. no further orders at this time.

## 2025-02-23 NOTE — ED ADULT NURSE NOTE - HIV OFFER
Patient was called and notified of Dr Bonilla's suggestions below   She will try the Miralax, stool softener, and suppository  She will increase water intake   If any severe ABD pain, vomiting needs to go to ER   Patient will f/u with office PRN     Patient verbalized understanding and had no further questions or concerns at this time      Previously Negative (within the last year)

## 2025-02-23 NOTE — ED PROVIDER NOTE - PATIENT PORTAL LINK FT
You can access the FollowMyHealth Patient Portal offered by [facility] by registering at the following website: [email or web address]. By joining [employer]s FollowMyHealth portal, you will also be able to view your health information using other applications (apps) compatible with our system.

## 2025-02-23 NOTE — ED ADULT TRIAGE NOTE - CHIEF COMPLAINT QUOTE
Coming from [facility] 74 c/o upper abd discomfort associated with nausea and one episode of vomiting. Endorsing dizziness and headache. Denies fever, chills, sob, cough, chest pain. Pmhx: htn, hld, schizophrenia, asthma Coming from [facility] 74 c/o upper abd discomfort associated with nausea and one episode of vomiting beginning last night. Endorsing dizziness and headache. Denies fever, chills, sob, cough, chest pain. Pmhx: htn, hld, schizophrenia, asthma

## 2025-02-23 NOTE — ED ADULT NURSE NOTE - NSFALLCONCLUSION_ED_ALL_ED
You have a known COVID infection. Continue to monitor your symptoms at home. You may buy a pulse oxygen monitor from [employer] or [place] and continue to monitor your oxygen levels. We recommend admission to the hospital if your pulse oxygen drops below 90% consistently. Otherwise, continue to treat your symptoms with over the counter medication.  - If you have headache, fever, chills, or muscle aches alternate taking 650 mg to 1000 mg acetaminophen and 600 mg ibuprofen for pain. You can take ONE of these medications every 4 hours as long as you continue to ALTERNATE them. Failing to ALTERNATE the medications every four hours could result in damage to either your kidneys or liver.  - If you have congestion you may take Mucinex or purchase a Flonase nasal spray.  - If you have diarrhea you may take imodium over the counter but should discontinue this medication if you notice any blood in your stool.    If you are diagnosed with COVID-19 you should NOT return to normal activities until you:  Isolate yourself for 5 days no matter what. If you meet criteria #2 and #3 below you may go out in public if necessary but should wear a mask when doing so for at least 10 days after your initial symptoms started.  Are over 24 hours fever free (\"fever\" is defined as a temperature above 100.4F) without the use of tylenol or ibuprofen  Do not have respiratory symptoms (cough, shortness of breath, wheezing) or these symptoms are resolving.    If you are Covid positive and have a diagnosed medical condition by a licensed medical provider which causes you to have a weak immune system (cancer, auto-immune disease), the CDC recommends isolating for 10 days instead of 5 days because it will likely take you longer to improve.    Repeat COVID testing to obtain a negative COVID test after you are diagnosed is NOT recommended at this time. If you are not already, you may get vaccinated for COVID-19 three months after you recover from  covid infection.    If you are exposed to COVID-19 wear a high quality mask for 10 days and then get tested on the fifth day after exposure.   Universal Safety Interventions

## 2025-02-23 NOTE — ED PROVIDER NOTE - NSFOLLOWUPINSTRUCTIONS_ED_ALL_ED_FT
Advance activity as tolerated.  Continue all previously prescribed medications as directed unless otherwise instructed.  Follow up with your primary care physician in 48-72 hours- bring copies of your results.  Return to the ER for worsening or persistent symptoms, and/or ANY NEW OR CONCERNING SYMPTOMS. If you have issues obtaining follow up, please call: [phone] to obtain a doctor or specialist who takes your insurance in your area.  You may call [phone] to make an appointment with the internal medicine clinic.

## 2025-02-23 NOTE — ED PROVIDER NOTE - ATTENDING APP SHARED VISIT CONTRIBUTION OF CARE
23yo M pw nausea, vomiting abdominal pain dizziness since last night, no gait instability   pt well appearing, abd nontender  will check basic labs, supportive care

## 2025-02-23 NOTE — PROVIDER CONTACT NOTE (OTHER) - ASSESSMENT
BRIANA called [facility], [phone] and spoke with Jhoan who verified the pt can return via taxi to [address] [place] [address] - [address].    Taxi transport was arranged with Rupa ([phone]), MAS auth #[id number], p/u by 12 noon.     Verbal huddle regarding coordination of care with interdisciplinary team completed.

## 2025-02-23 NOTE — ED ADULT NURSE NOTE - NSFALLUNIVINTERV_ED_ALL_ED
Bed/Stretcher in lowest position, wheels locked, appropriate side rails in place/Call bell, personal items and telephone in reach/Instruct patient to call for assistance before getting out of bed/chair/stretcher/Non-slip footwear applied when patient is off stretcher/[place] to call system/Physically safe environment - no spills, clutter or unnecessary equipment/Purposeful proactive rounding/Room/bathroom lighting operational, light cord in reach

## 2025-02-23 NOTE — ED PROVIDER NOTE - OBJECTIVE STATEMENT
25 y/o male pmh schizophrenia presents to the Ed with epigastric abd pain associated with one episode of NBNB vomiting. he states he has known history of gastritis and had endoscopy done. he denies fever, chills, diarrhea, CP or SOB. he states he also feels constipated. last BM was yesterday and passing gas.

## 2025-02-27 NOTE — ED ADULT NURSE NOTE - SUICIDE SCREENING QUESTION 2
CALLED PT AND SPOKE TO ROSALVA. PT SAW CARDIOLOGY (JOSIAS REED CNP) YESTERDAY AND WAS TAKEN OFF WARFARIN AND ELIQUIS STARTED.    No

## 2025-03-03 NOTE — ED ADULT NURSE NOTE - CHIEF COMPLAINT QUOTE
DISPLAY PLAN FREE TEXT c/o diffused abd pain and feeling constipation, onset this morning , but reports had 1 episode of hard bowel movement, from [facility] [address], Phx of Schizophrenia. DISPLAY PLAN FREE TEXT DISPLAY PLAN FREE TEXT DISPLAY PLAN FREE TEXT

## 2025-03-04 NOTE — ED PROVIDER NOTE - NS_EDPROVIDERDISPOUSERTYPE_ED_A_ED
Detail Level: Zone
I have personally evaluated and examined the patient. The Attending was available to me as a supervising provider if needed.

## 2025-03-14 NOTE — ED PROVIDER NOTE - NEUROLOGICAL, MLM
"Levy Gross (74 y.o. Female)       Date of Birth   1950    Social Security Number       Address   [address] [place]    Home Phone   [phone]    MRN   [id number]       Uatsdin   Orthodox    Marital Status                               Admission Date   3/11/2025    Admission Type   Emergency    Admitting Provider   Juan Cleaning MD    Attending Provider   Juan Cleaning MD    Department, Room/Bed   [facility] INPATIENT, 358/1       Discharge Date       Discharge Disposition   Home or Self Care    Discharge Destination                                 Attending Provider: Juan Cleaning MD    Allergies: Ambien  [Zolpidem], Codeine, Pregabalin, Sulfa Antibiotics, Zolpidem Tartrate    Isolation: Enh Drop/Con   Infection: COVID (confirmed) (03/12/25)   Code Status: No CPR    Ht: 162.6 cm (64\")   Wt: 99.3 kg (219 lb)    Admission Cmt: None   Principal Problem: Bilateral leg edema [R60.0]                   Active Insurance as of 3/11/2025       Primary Coverage       Payor Plan Insurance Group Employer/Plan Group    ANTHEM MEDICARE REPLACEMENT ANTHEM MEDICARE ADVANTAGE PPO [id number]       Payor Plan Address Payor Plan Phone Number Payor Plan Fax Number Effective Dates    [address] [phone]  1/1/2024 - None Entered    [facility] [id number]         Subscriber Name Subscriber Birth Date Member ID       LEVY GROSS 1950 [id number]                     Emergency Contacts        (Rel.) Home Phone Work Phone Mobile Phone    JOHN CULVER (Son) [phone] -- [phone]    Cecilio Gross (Son) -- -- [phone]    Maryam Duffy \"Divine\" (Sister) -- -- [phone]    Chris Culver (Grandchild) -- -- [phone]          " Alert and oriented, no focal deficits, no motor or sensory deficits.

## 2025-04-24 NOTE — ED ADULT NURSE NOTE - TEMPLATE
no lesions,  no deformities, chest wall non-tender,  no masses present, breathing is unlabored without accessory muscle use,normal breath sounds General

## 2025-04-25 ENCOUNTER — EMERGENCY (EMERGENCY)
Facility: HOSPITAL | Age: 25
LOS: 1 days | End: 2025-04-25
Admitting: EMERGENCY MEDICINE
Payer: MEDICAID

## 2025-04-25 VITALS
DIASTOLIC BLOOD PRESSURE: 99 MMHG | HEIGHT: 66 IN | TEMPERATURE: 98 F | OXYGEN SATURATION: 97 % | HEART RATE: 84 BPM | WEIGHT: 164.91 LBS | RESPIRATION RATE: 18 BRPM | SYSTOLIC BLOOD PRESSURE: 144 MMHG

## 2025-04-25 PROCEDURE — 99284 EMERGENCY DEPT VISIT MOD MDM: CPT

## 2025-04-25 RX ORDER — HALOPERIDOL 10 MG/1
5 TABLET ORAL ONCE
Refills: 0 | Status: COMPLETED | OUTPATIENT
Start: 2025-04-25 | End: 2025-04-25

## 2025-04-25 RX ORDER — HALOPERIDOL 10 MG/1
5 TABLET ORAL ONCE
Refills: 0 | Status: DISCONTINUED | OUTPATIENT
Start: 2025-04-25 | End: 2025-04-25

## 2025-04-25 RX ADMIN — HALOPERIDOL 5 MILLIGRAM(S): 10 TABLET ORAL at 21:56

## 2025-04-25 NOTE — ED PROVIDER NOTE - OBJECTIVE STATEMENT
24 y/o M  hx  Schizophrenia  presents to ER   secondary to increase anxiousness.  Admits that staff was teasing him.  Denies SI/HI/AH/VH.  Denies pain, SOB, fever, chills, chest/abdominal discomfort. Denies falling, punching or kicking  any objects. No evidence of physical injuries, broken skin or  deformities. Denies use of alcohol or  illicit drugs.

## 2025-04-25 NOTE — ED ADULT NURSE NOTE - OBJECTIVE STATEMENT
Pt received to  accompanied by EMS from [address] [employer]. Pt presents pleasant, calm and cooperative and is endorsing suicidal ideation due to "family issues" he is experiencing. Pt states everytime he goes home to visit family, "they start yelling and screaming at him" which has him feeling suicidal. Pt denies plan or intent; denies drug or alcohol use; denies previous SA. Pt endorses daily compliance with prescribed   medications; also denies HI. Pts belongings secured for safety. Pt awaiting further evaluation.

## 2025-04-25 NOTE — ED BEHAVIORAL HEALTH NOTE - BEHAVIORAL HEALTH NOTE
Writer called [facility], [address], [phone] for collateral information. Writer made several attempts but was unable to reach staff. Writer will continue to f/u. Writer called [facility], [address], [phone] for collateral information. Writer made several attempts but was unable to reach staff. Writer will continue to f/u.    Writer spoke w/ Pippa GILL  ([phone]) who provided the following information.    patient is a 24 yo male domiciled at residence, hx of schizophrenia, intellectual disability, bib ems.  Pt was sent in by ems after endorsing SI. pt said he didn't want to be here anymore and endorsed si.  no plan or intention reported.  pt endorses feeling lonely. no AVH, paranoia or delusions unsure of ADLS.  no drug or alcohol use. no past suicide attempts known but pt has a hx of endorsing SI. facesheet was sent w/ medication and treatment team. pt is compliant w/ medication. no dangerous activities notes. NO further safety concerns reported. Writer agreed to keep staff updated.

## 2025-04-25 NOTE — ED PROVIDER NOTE - CLINICAL SUMMARY MEDICAL DECISION MAKING FREE TEXT BOX
Medical evaluation performed. There is no clinical evidence of intoxication or any acute medical problem requiring immediate intervention.  BRIANA consulted / D/C to [place] via cab.

## 2025-04-25 NOTE — PROVIDER CONTACT NOTE (OTHER) - SITUATION
As per AMISH Mendez, pt is cleared for discharge at this time. PT from [facility] at [phone]. Writer contacted [facility] and [facility] , Johnny, who will inform other staff
Normal for race

## 2025-04-25 NOTE — ED PROVIDER NOTE - PATIENT PORTAL LINK FT
You can access the FollowMyHealth Patient Portal offered by [facility] by registering at the following website: [email or web address]. By joining [facility]’s FollowMyHealth portal, you will also be able to view your health information using other applications (apps) compatible with our system.

## 2025-04-25 NOTE — ED BEHAVIORAL HEALTH NOTE - BEHAVIORAL HEALTH NOTE
as per cesar "Current Care Coordination  [facility] [address] [address] [facility]. [facility]. (Admission Date: 04-FEB-21)  Program Contact Information : Monica Rose: [phone]. [phone]  Notifications  Prescription Prior AuthorizationThis client has been taking a prescription medication in the past 3 months that may require [place] prior authorization: Olanzapine, Omeprazole, Pantoprazole Sodium.  To obtain a prior authorization call [phone] or fax the appropriate Prior Authorization Form to [phone].  Standard PA Form : [email or web address]_Fax_[email or web address]  Other Specialized PA Forms: [email or web address]://[employer]/providers/pa_forms.asp  Mental Health Placement Consideration due to1 or more ER visits or inpatient stays in the past year with a suicide attempt/ suicide ideation/ self-harm code; 1 or more PROS services in past 5 years; Evidence of Supplemental Security Income (SSI) or SSD AND Any [facility] Specialty MH Service in past 5 years; OM Housing history in past 5 years  CORE EligibilityThis client is eligible for Community Oriented Recovery and Empowerment (CORE) services. For more information on CORE, visit:[email or web address].ny.gov/[facility]/bho/core  Alerts • all availableMost Recent   2Treatment for Suicidal Ideation (2 ER)  [facility]  Active Quality Flags • as of monthly QI report 3/1/2025  Health Home Care Management - Adult  HARP Enrolled - Not Health Home Enrolled • HARP-Enrolled - No Assessment for HCBS  High Utilization - Inpt/ER  10+ ER - All Cause • 2+ ER - Medical • 4+ Inpatient/ER - Med  Mental Health Placement Consideration  1 or more ER visits or inpatient stays in the past year with a suicide attempt/ suicide ideation/ self-harm code • 1 or more PROS services in past 5 years • Evidence of Supplemental Security Income (SSI) or SSD AND Any [facility] Specialty MH Service in past 5 years • [facility] Housing history in past 5 years  Polypharmacy  Antipsychotic Two Plus  Diagnoses Past Year  Behavioral Health (1)  Most Recent:Schizophrenia  Most Frequent (# of services):Schizophrenia(20)  Medical (35)  5 Most Recent:Illness, unspecified • Body mass index [BMI] • Overweight and obesity • Abdominal and pelvic pain • Headache ...  5 Most Frequent (# of services):Abdominal and pelvic pain(15) • Personal risk factors, not elsewhere classified(11) • Asthma(8) • Gastro-esophageal reflux disease(6) • Illness, unspecified(5) ...  Medications Past YearLast   Haloperidol Decanoate (Haloperidol Decanoate) • Antipsychotic4/9/2025Dose: 100 MG/ML, .07/day • Quantity: 2  Meloxicam (Meloxicam) • Nonsteroidal Anti-inflammatory Agents (NSAIDs)4/8/2025Dose: 7.5 MG, 1/day • Quantity: 7  Losartan Potassium (Losartan Potassium) • Angiotensin II Receptor Antagonists3/27/2025Dose: 50 MG, 1/day • Quantity: 30  Olanzapine (Olanzapine) • Antipsychotic3/24/2025Dose: 10 MG, 1/day • Quantity: 30  Pantoprazole Sodium (Pantoprazole Sodium) • Proton Pump Inhibitors3/21/2025Dose: 40 MG, 1/day • Quantity: 30  Omeprazole (Omeprazole) • Proton Pump Inhibitors3/16/2025Dose: 40 MG, 1/day • Quantity: 30  Lovastatin (Lovastatin) • HMG CoA Reductase Inhibitors2/28/2025Dose: 20 MG, 1/day • Quantity: 30  Famotidine (Famotidine) • H-2 Antagonists2/25/2025Dose: 40 MG, 1/day • Quantity: 30  Loperamide Hcl (Loperamide Hcl) • Antiperistaltic Agents2/1/2025Dose: 2 MG, 6.67/day • Quantity: 20  Ondansetron (Ondansetron) • 5-HT3 Receptor Antagonists1/30/2025Dose: 4 MG, 3/day • Quantity: 9  Acetaminophen (Acetaminophen) • Analgesics Other8/31/2024Dose: 500 MG, 3/day • Quantity: 21  Albuterol Sulfate (Ventolin Hfa) • Sympathomimetics8/31/2024Dose: 108 (90 Base) MCG/ACT/day • Quantity: 18  Fluticasone Propionate (Nasal) (Fluticasone Propionate) • Nasal Steroids8/29/2024Dose: 50 MCG/ACT, .27/day • Quantity: 16  Polyethylene Glycol 3350 (Peg 3350) • Laxatives - Miscellaneous8/15/2024Dose: 17 GM/SCOOP, 34/day • Quantity: 238  Acetaminophen (Acetaminophen Extra Strength) • Analgesics Other5/26/2024Dose: 500 MG, 4/day • Quantity: 28  Meclizine Hcl (Meclizine Hcl) • Antiemetics - Anticholinergic4/28/2024Dose: 25 MG, 2/day • Quantity: 14  Alum & Mag Hydroxide-Simeth (Mintox Maximum Strength) • Antacid Combinations4/28/2024Dose: 400-400-40 MG/5ML/day • Quantity: 280  Outpatient Providers Past YearLast Service Date & Type  DEBBY LOPEZ MD3/20/2025Physician - Internal Medicine  [facility]3/4/2025Clinic - Medical Specialty  [facility]2/28/2025PROS -  Specialty  [facility]1/30/2025Urgent Care - Medical Dx  [facility]12/31/2024Physicians Group - Urology  CARLOS VIGIL MD [facility]10/14/2024Physicians Group - Internal Medicine  [facility]8/27/2024Clinic - Medical Specialty  [address] [place] [address] [facility]8/27/2024Physician Group  [facility]7/10/2024Physicians Group - Internal Medicine  [facility]7/2/2024Clinic - Medical Specialty  All Hospital and Crisis Utilization • 5 Years  ER Visits# ProvidersLast ER Visit  [id number]/5/2025 at [address] Bun[place]p0922/9/2025 at [facility]  Inpatient Admissions# ProvidersLast Inpatient Admission  No Medicaid claims for this data type in the past 5 years  Crisis Services# ProvidersLast Crisis Service  No Medicaid claims for this data type in the past 5 years  Brief Overview as of 4/24/2025  "

## 2025-04-25 NOTE — ED ADULT TRIAGE NOTE - CHIEF COMPLAINT QUOTE
Form [facility] c/o suicidal ideations onset this evening denies having a plan, denies H/I or A//vH, clam and cooperative, Phx of schizophrenia.

## 2025-04-25 NOTE — PROVIDER CONTACT NOTE (OTHER) - BACKGROUND
member of pt's return when available. As per previous SW note pt is okay to travel INDEPENDENTLY via [place]. NP in agreement. MAS tax arranged for pt discharge via [facility]

## 2025-05-22 ENCOUNTER — EMERGENCY (EMERGENCY)
Facility: HOSPITAL | Age: 25
LOS: 1 days | End: 2025-05-22
Attending: EMERGENCY MEDICINE | Admitting: EMERGENCY MEDICINE
Payer: MEDICAID

## 2025-05-22 VITALS
RESPIRATION RATE: 18 BRPM | OXYGEN SATURATION: 100 % | HEART RATE: 81 BPM | WEIGHT: 214.95 LBS | SYSTOLIC BLOOD PRESSURE: 123 MMHG | HEIGHT: 66 IN | DIASTOLIC BLOOD PRESSURE: 83 MMHG | TEMPERATURE: 98 F

## 2025-05-22 VITALS
RESPIRATION RATE: 16 BRPM | DIASTOLIC BLOOD PRESSURE: 78 MMHG | OXYGEN SATURATION: 100 % | HEART RATE: 87 BPM | SYSTOLIC BLOOD PRESSURE: 117 MMHG | TEMPERATURE: 98 F

## 2025-05-22 DIAGNOSIS — F79 UNSPECIFIED INTELLECTUAL DISABILITIES: ICD-10-CM

## 2025-05-22 DIAGNOSIS — F20.9 SCHIZOPHRENIA, UNSPECIFIED: ICD-10-CM

## 2025-05-22 LAB
AMPHET UR-MCNC: NEGATIVE — SIGNIFICANT CHANGE UP
ANION GAP SERPL CALC-SCNC: 10 MMOL/L — SIGNIFICANT CHANGE UP (ref 7–14)
APAP SERPL-MCNC: <10 UG/ML — LOW (ref 15–25)
APPEARANCE UR: CLEAR — SIGNIFICANT CHANGE UP
BARBITURATES UR SCN-MCNC: NEGATIVE — SIGNIFICANT CHANGE UP
BASOPHILS # BLD AUTO: 0.05 K/UL — SIGNIFICANT CHANGE UP (ref 0–0.2)
BASOPHILS NFR BLD AUTO: 1.3 % — SIGNIFICANT CHANGE UP (ref 0–2)
BENZODIAZ UR-MCNC: NEGATIVE — SIGNIFICANT CHANGE UP
BILIRUB UR-MCNC: NEGATIVE — SIGNIFICANT CHANGE UP
BUN SERPL-MCNC: 16 MG/DL — SIGNIFICANT CHANGE UP (ref 7–23)
CALCIUM SERPL-MCNC: 9.4 MG/DL — SIGNIFICANT CHANGE UP (ref 8.4–10.5)
CHLORIDE SERPL-SCNC: 103 MMOL/L — SIGNIFICANT CHANGE UP (ref 98–107)
CO2 SERPL-SCNC: 24 MMOL/L — SIGNIFICANT CHANGE UP (ref 22–31)
COCAINE METAB.OTHER UR-MCNC: NEGATIVE — SIGNIFICANT CHANGE UP
COLOR SPEC: YELLOW — SIGNIFICANT CHANGE UP
CREAT SERPL-MCNC: 1.03 MG/DL — SIGNIFICANT CHANGE UP (ref 0.5–1.3)
CREATININE URINE RESULT, DAU: 230 MG/DL — SIGNIFICANT CHANGE UP
DIFF PNL FLD: NEGATIVE — SIGNIFICANT CHANGE UP
EGFR: 102 ML/MIN/1.73M2 — SIGNIFICANT CHANGE UP
EGFR: 102 ML/MIN/1.73M2 — SIGNIFICANT CHANGE UP
EOSINOPHIL # BLD AUTO: 0.19 K/UL — SIGNIFICANT CHANGE UP (ref 0–0.5)
EOSINOPHIL NFR BLD AUTO: 5 % — SIGNIFICANT CHANGE UP (ref 0–6)
ETHANOL SERPL-MCNC: <10 MG/DL — SIGNIFICANT CHANGE UP
FENTANYL UR QL SCN: NEGATIVE — SIGNIFICANT CHANGE UP
GLUCOSE SERPL-MCNC: 84 MG/DL — SIGNIFICANT CHANGE UP (ref 70–99)
GLUCOSE UR QL: NEGATIVE MG/DL — SIGNIFICANT CHANGE UP
HCT VFR BLD CALC: 42.8 % — SIGNIFICANT CHANGE UP (ref 39–50)
HGB BLD-MCNC: 13.7 G/DL — SIGNIFICANT CHANGE UP (ref 13–17)
IANC: 1.78 K/UL — LOW (ref 1.8–7.4)
IMM GRANULOCYTES NFR BLD AUTO: 0.5 % — SIGNIFICANT CHANGE UP (ref 0–0.9)
KETONES UR QL: ABNORMAL MG/DL
LEUKOCYTE ESTERASE UR-ACNC: NEGATIVE — SIGNIFICANT CHANGE UP
LYMPHOCYTES # BLD AUTO: 1.47 K/UL — SIGNIFICANT CHANGE UP (ref 1–3.3)
LYMPHOCYTES # BLD AUTO: 38.5 % — SIGNIFICANT CHANGE UP (ref 13–44)
MCHC RBC-ENTMCNC: 25.1 PG — LOW (ref 27–34)
MCHC RBC-ENTMCNC: 32 G/DL — SIGNIFICANT CHANGE UP (ref 32–36)
MCV RBC AUTO: 78.4 FL — LOW (ref 80–100)
METHADONE UR-MCNC: NEGATIVE — SIGNIFICANT CHANGE UP
MONOCYTES # BLD AUTO: 0.31 K/UL — SIGNIFICANT CHANGE UP (ref 0–0.9)
MONOCYTES NFR BLD AUTO: 8.1 % — SIGNIFICANT CHANGE UP (ref 2–14)
NEUTROPHILS # BLD AUTO: 1.78 K/UL — LOW (ref 1.8–7.4)
NEUTROPHILS NFR BLD AUTO: 46.6 % — SIGNIFICANT CHANGE UP (ref 43–77)
NITRITE UR-MCNC: NEGATIVE — SIGNIFICANT CHANGE UP
NRBC # BLD AUTO: 0 K/UL — SIGNIFICANT CHANGE UP (ref 0–0)
NRBC # FLD: 0 K/UL — SIGNIFICANT CHANGE UP (ref 0–0)
NRBC BLD AUTO-RTO: 0 /100 WBCS — SIGNIFICANT CHANGE UP (ref 0–0)
OPIATES UR-MCNC: NEGATIVE — SIGNIFICANT CHANGE UP
OXYCODONE UR-MCNC: NEGATIVE — SIGNIFICANT CHANGE UP
PCP SPEC-MCNC: SIGNIFICANT CHANGE UP
PCP UR-MCNC: NEGATIVE — SIGNIFICANT CHANGE UP
PH UR: 7 — SIGNIFICANT CHANGE UP (ref 5–8)
PLATELET # BLD AUTO: 278 K/UL — SIGNIFICANT CHANGE UP (ref 150–400)
POTASSIUM SERPL-MCNC: 4.6 MMOL/L — SIGNIFICANT CHANGE UP (ref 3.5–5.3)
POTASSIUM SERPL-SCNC: 4.6 MMOL/L — SIGNIFICANT CHANGE UP (ref 3.5–5.3)
PROT UR-MCNC: SIGNIFICANT CHANGE UP MG/DL
RBC # BLD: 5.46 M/UL — SIGNIFICANT CHANGE UP (ref 4.2–5.8)
RBC # FLD: 13.2 % — SIGNIFICANT CHANGE UP (ref 10.3–14.5)
SALICYLATES SERPL-MCNC: <0.3 MG/DL — LOW (ref 15–30)
SARS-COV-2 RNA SPEC QL NAA+PROBE: SIGNIFICANT CHANGE UP
SODIUM SERPL-SCNC: 137 MMOL/L — SIGNIFICANT CHANGE UP (ref 135–145)
SP GR SPEC: 1.02 — SIGNIFICANT CHANGE UP (ref 1–1.03)
THC UR QL: NEGATIVE — SIGNIFICANT CHANGE UP
TOXICOLOGY SCREEN, DRUGS OF ABUSE, SERUM RESULT: SIGNIFICANT CHANGE UP
TSH SERPL-MCNC: 1.17 UIU/ML — SIGNIFICANT CHANGE UP (ref 0.27–4.2)
UROBILINOGEN FLD QL: 1 MG/DL — SIGNIFICANT CHANGE UP (ref 0.2–1)
WBC # BLD: 3.82 K/UL — SIGNIFICANT CHANGE UP (ref 3.8–10.5)
WBC # FLD AUTO: 3.82 K/UL — SIGNIFICANT CHANGE UP (ref 3.8–10.5)

## 2025-05-22 PROCEDURE — 90792 PSYCH DIAG EVAL W/MED SRVCS: CPT

## 2025-05-22 PROCEDURE — 99284 EMERGENCY DEPT VISIT MOD MDM: CPT

## 2025-06-24 NOTE — ED ADULT NURSE NOTE - SUICIDE SCREENING QUESTION 3
No
Introduction Text (Please End With A Colon): Face , chest ;
Size Of Lesion In Cm (Optional): 0
Procedure To Be Performed At Next Visit: Chemical Peel TCA
Detail Level: Simple
Introduction Text (Please End With A Colon): ;
Procedure To Be Performed At Next Visit: ED&C

## 2025-08-10 ENCOUNTER — EMERGENCY (EMERGENCY)
Facility: HOSPITAL | Age: 25
LOS: 1 days | End: 2025-08-10
Admitting: EMERGENCY MEDICINE
Payer: MEDICAID

## 2025-08-10 VITALS
DIASTOLIC BLOOD PRESSURE: 81 MMHG | SYSTOLIC BLOOD PRESSURE: 122 MMHG | RESPIRATION RATE: 18 BRPM | HEART RATE: 100 BPM | TEMPERATURE: 98 F | WEIGHT: 223.99 LBS | OXYGEN SATURATION: 100 %

## 2025-08-10 VITALS
OXYGEN SATURATION: 99 % | HEART RATE: 74 BPM | SYSTOLIC BLOOD PRESSURE: 120 MMHG | DIASTOLIC BLOOD PRESSURE: 82 MMHG | TEMPERATURE: 98 F | RESPIRATION RATE: 16 BRPM

## 2025-08-10 LAB
ALBUMIN SERPL ELPH-MCNC: 4.5 G/DL — SIGNIFICANT CHANGE UP (ref 3.3–5)
ALP SERPL-CCNC: 50 U/L — SIGNIFICANT CHANGE UP (ref 40–120)
ALT FLD-CCNC: 24 U/L — SIGNIFICANT CHANGE UP (ref 4–41)
ANION GAP SERPL CALC-SCNC: 11 MMOL/L — SIGNIFICANT CHANGE UP (ref 7–14)
AST SERPL-CCNC: 18 U/L — SIGNIFICANT CHANGE UP (ref 4–40)
BASOPHILS # BLD AUTO: 0.05 K/UL — SIGNIFICANT CHANGE UP (ref 0–0.2)
BASOPHILS NFR BLD AUTO: 0.9 % — SIGNIFICANT CHANGE UP (ref 0–2)
BILIRUB SERPL-MCNC: <0.2 MG/DL — SIGNIFICANT CHANGE UP (ref 0.2–1.2)
BUN SERPL-MCNC: 21 MG/DL — SIGNIFICANT CHANGE UP (ref 7–23)
CALCIUM SERPL-MCNC: 9.4 MG/DL — SIGNIFICANT CHANGE UP (ref 8.4–10.5)
CHLORIDE SERPL-SCNC: 102 MMOL/L — SIGNIFICANT CHANGE UP (ref 98–107)
CO2 SERPL-SCNC: 22 MMOL/L — SIGNIFICANT CHANGE UP (ref 22–31)
CREAT SERPL-MCNC: 1.01 MG/DL — SIGNIFICANT CHANGE UP (ref 0.5–1.3)
EGFR: 105 ML/MIN/1.73M2 — SIGNIFICANT CHANGE UP
EGFR: 105 ML/MIN/1.73M2 — SIGNIFICANT CHANGE UP
EOSINOPHIL # BLD AUTO: 0.19 K/UL — SIGNIFICANT CHANGE UP (ref 0–0.5)
EOSINOPHIL NFR BLD AUTO: 3.6 % — SIGNIFICANT CHANGE UP (ref 0–6)
GLUCOSE SERPL-MCNC: 103 MG/DL — HIGH (ref 70–99)
HCT VFR BLD CALC: 42.7 % — SIGNIFICANT CHANGE UP (ref 39–50)
HGB BLD-MCNC: 14 G/DL — SIGNIFICANT CHANGE UP (ref 13–17)
IMM GRANULOCYTES # BLD AUTO: 0.01 K/UL — SIGNIFICANT CHANGE UP (ref 0–0.07)
IMM GRANULOCYTES NFR BLD AUTO: 0.2 % — SIGNIFICANT CHANGE UP (ref 0–0.9)
LIDOCAIN IGE QN: 43 U/L — SIGNIFICANT CHANGE UP (ref 7–60)
LYMPHOCYTES # BLD AUTO: 2.22 K/UL — SIGNIFICANT CHANGE UP (ref 1–3.3)
LYMPHOCYTES NFR BLD AUTO: 41.7 % — SIGNIFICANT CHANGE UP (ref 13–44)
MCHC RBC-ENTMCNC: 25.1 PG — LOW (ref 27–34)
MCHC RBC-ENTMCNC: 32.8 G/DL — SIGNIFICANT CHANGE UP (ref 32–36)
MCV RBC AUTO: 76.7 FL — LOW (ref 80–100)
MONOCYTES # BLD AUTO: 0.35 K/UL — SIGNIFICANT CHANGE UP (ref 0–0.9)
MONOCYTES NFR BLD AUTO: 6.6 % — SIGNIFICANT CHANGE UP (ref 2–14)
NEUTROPHILS # BLD AUTO: 2.51 K/UL — SIGNIFICANT CHANGE UP (ref 1.8–7.4)
NEUTROPHILS NFR BLD AUTO: 47 % — SIGNIFICANT CHANGE UP (ref 43–77)
NRBC # BLD AUTO: 0 K/UL — SIGNIFICANT CHANGE UP (ref 0–0)
NRBC # FLD: 0 K/UL — SIGNIFICANT CHANGE UP (ref 0–0)
NRBC BLD AUTO-RTO: 0 /100 WBCS — SIGNIFICANT CHANGE UP (ref 0–0)
PLATELET # BLD AUTO: 250 K/UL — SIGNIFICANT CHANGE UP (ref 150–400)
PMV BLD: 11 FL — SIGNIFICANT CHANGE UP (ref 7–13)
POTASSIUM SERPL-MCNC: 4.3 MMOL/L — SIGNIFICANT CHANGE UP (ref 3.5–5.3)
POTASSIUM SERPL-SCNC: 4.3 MMOL/L — SIGNIFICANT CHANGE UP (ref 3.5–5.3)
PROT SERPL-MCNC: 7.3 G/DL — SIGNIFICANT CHANGE UP (ref 6–8.3)
RBC # BLD: 5.57 M/UL — SIGNIFICANT CHANGE UP (ref 4.2–5.8)
RBC # FLD: 13.2 % — SIGNIFICANT CHANGE UP (ref 10.3–14.5)
SODIUM SERPL-SCNC: 135 MMOL/L — SIGNIFICANT CHANGE UP (ref 135–145)
TSH SERPL-MCNC: 2.71 UIU/ML — SIGNIFICANT CHANGE UP (ref 0.27–4.2)
WBC # BLD: 5.33 K/UL — SIGNIFICANT CHANGE UP (ref 3.8–10.5)
WBC # FLD AUTO: 5.33 K/UL — SIGNIFICANT CHANGE UP (ref 3.8–10.5)

## 2025-08-10 PROCEDURE — 99284 EMERGENCY DEPT VISIT MOD MDM: CPT

## 2025-08-10 PROCEDURE — 93010 ELECTROCARDIOGRAM REPORT: CPT

## 2025-08-10 RX ADMIN — Medication 20 MILLIGRAM(S): at 16:56

## 2025-08-10 RX ADMIN — Medication 1000 MILLILITER(S): at 16:57

## 2025-08-27 ENCOUNTER — EMERGENCY (EMERGENCY)
Facility: HOSPITAL | Age: 25
LOS: 1 days | End: 2025-08-27
Admitting: EMERGENCY MEDICINE
Payer: MEDICAID

## 2025-08-27 VITALS
RESPIRATION RATE: 16 BRPM | SYSTOLIC BLOOD PRESSURE: 138 MMHG | OXYGEN SATURATION: 100 % | DIASTOLIC BLOOD PRESSURE: 59 MMHG | HEART RATE: 64 BPM

## 2025-08-27 VITALS
RESPIRATION RATE: 18 BRPM | TEMPERATURE: 98 F | OXYGEN SATURATION: 99 % | SYSTOLIC BLOOD PRESSURE: 104 MMHG | HEART RATE: 85 BPM | DIASTOLIC BLOOD PRESSURE: 67 MMHG

## 2025-08-27 LAB
ALBUMIN SERPL ELPH-MCNC: 4.1 G/DL — SIGNIFICANT CHANGE UP (ref 3.3–5)
ALP SERPL-CCNC: 47 U/L — SIGNIFICANT CHANGE UP (ref 40–120)
ALT FLD-CCNC: 17 U/L — SIGNIFICANT CHANGE UP (ref 4–41)
ANION GAP SERPL CALC-SCNC: 11 MMOL/L — SIGNIFICANT CHANGE UP (ref 7–14)
APPEARANCE UR: CLEAR — SIGNIFICANT CHANGE UP
AST SERPL-CCNC: 17 U/L — SIGNIFICANT CHANGE UP (ref 4–40)
BASOPHILS # BLD AUTO: 0.05 K/UL — SIGNIFICANT CHANGE UP (ref 0–0.2)
BASOPHILS NFR BLD AUTO: 0.9 % — SIGNIFICANT CHANGE UP (ref 0–2)
BILIRUB SERPL-MCNC: <0.2 MG/DL — SIGNIFICANT CHANGE UP (ref 0.2–1.2)
BILIRUB UR-MCNC: NEGATIVE — SIGNIFICANT CHANGE UP
BUN SERPL-MCNC: 18 MG/DL — SIGNIFICANT CHANGE UP (ref 7–23)
CALCIUM SERPL-MCNC: 9.7 MG/DL — SIGNIFICANT CHANGE UP (ref 8.4–10.5)
CHLORIDE SERPL-SCNC: 105 MMOL/L — SIGNIFICANT CHANGE UP (ref 98–107)
CO2 SERPL-SCNC: 23 MMOL/L — SIGNIFICANT CHANGE UP (ref 22–31)
COLOR SPEC: YELLOW — SIGNIFICANT CHANGE UP
CREAT SERPL-MCNC: 0.95 MG/DL — SIGNIFICANT CHANGE UP (ref 0.5–1.3)
DIFF PNL FLD: NEGATIVE — SIGNIFICANT CHANGE UP
EGFR: 113 ML/MIN/1.73M2 — SIGNIFICANT CHANGE UP
EGFR: 113 ML/MIN/1.73M2 — SIGNIFICANT CHANGE UP
EOSINOPHIL # BLD AUTO: 0.28 K/UL — SIGNIFICANT CHANGE UP (ref 0–0.5)
EOSINOPHIL NFR BLD AUTO: 4.9 % — SIGNIFICANT CHANGE UP (ref 0–6)
GLUCOSE SERPL-MCNC: 83 MG/DL — SIGNIFICANT CHANGE UP (ref 70–99)
GLUCOSE UR QL: NEGATIVE MG/DL — SIGNIFICANT CHANGE UP
HCT VFR BLD CALC: 40 % — SIGNIFICANT CHANGE UP (ref 39–50)
HGB BLD-MCNC: 12.8 G/DL — LOW (ref 13–17)
IMM GRANULOCYTES # BLD AUTO: 0.02 K/UL — SIGNIFICANT CHANGE UP (ref 0–0.07)
IMM GRANULOCYTES NFR BLD AUTO: 0.4 % — SIGNIFICANT CHANGE UP (ref 0–0.9)
KETONES UR QL: NEGATIVE MG/DL — SIGNIFICANT CHANGE UP
LEUKOCYTE ESTERASE UR-ACNC: NEGATIVE — SIGNIFICANT CHANGE UP
LIDOCAIN IGE QN: 43 U/L — SIGNIFICANT CHANGE UP (ref 7–60)
LYMPHOCYTES # BLD AUTO: 2.02 K/UL — SIGNIFICANT CHANGE UP (ref 1–3.3)
LYMPHOCYTES NFR BLD AUTO: 35.6 % — SIGNIFICANT CHANGE UP (ref 13–44)
MCHC RBC-ENTMCNC: 24.9 PG — LOW (ref 27–34)
MCHC RBC-ENTMCNC: 32 G/DL — SIGNIFICANT CHANGE UP (ref 32–36)
MCV RBC AUTO: 77.8 FL — LOW (ref 80–100)
MONOCYTES # BLD AUTO: 0.4 K/UL — SIGNIFICANT CHANGE UP (ref 0–0.9)
MONOCYTES NFR BLD AUTO: 7.1 % — SIGNIFICANT CHANGE UP (ref 2–14)
NEUTROPHILS # BLD AUTO: 2.9 K/UL — SIGNIFICANT CHANGE UP (ref 1.8–7.4)
NEUTROPHILS NFR BLD AUTO: 51.1 % — SIGNIFICANT CHANGE UP (ref 43–77)
NITRITE UR-MCNC: NEGATIVE — SIGNIFICANT CHANGE UP
NRBC # BLD AUTO: 0 K/UL — SIGNIFICANT CHANGE UP (ref 0–0)
NRBC # FLD: 0 K/UL — SIGNIFICANT CHANGE UP (ref 0–0)
NRBC BLD AUTO-RTO: 0 /100 WBCS — SIGNIFICANT CHANGE UP (ref 0–0)
PH UR: 6 — SIGNIFICANT CHANGE UP (ref 5–8)
PLATELET # BLD AUTO: 310 K/UL — SIGNIFICANT CHANGE UP (ref 150–400)
PMV BLD: 10.7 FL — SIGNIFICANT CHANGE UP (ref 7–13)
POTASSIUM SERPL-MCNC: 4.5 MMOL/L — SIGNIFICANT CHANGE UP (ref 3.5–5.3)
POTASSIUM SERPL-SCNC: 4.5 MMOL/L — SIGNIFICANT CHANGE UP (ref 3.5–5.3)
PROT SERPL-MCNC: 7.1 G/DL — SIGNIFICANT CHANGE UP (ref 6–8.3)
PROT UR-MCNC: NEGATIVE MG/DL — SIGNIFICANT CHANGE UP
RBC # BLD: 5.14 M/UL — SIGNIFICANT CHANGE UP (ref 4.2–5.8)
RBC # FLD: 13.1 % — SIGNIFICANT CHANGE UP (ref 10.3–14.5)
SODIUM SERPL-SCNC: 139 MMOL/L — SIGNIFICANT CHANGE UP (ref 135–145)
SP GR SPEC: 1.02 — SIGNIFICANT CHANGE UP (ref 1–1.03)
UROBILINOGEN FLD QL: 0.2 MG/DL — SIGNIFICANT CHANGE UP (ref 0.2–1)
WBC # BLD: 5.67 K/UL — SIGNIFICANT CHANGE UP (ref 3.8–10.5)
WBC # FLD AUTO: 5.67 K/UL — SIGNIFICANT CHANGE UP (ref 3.8–10.5)

## 2025-08-27 PROCEDURE — 74177 CT ABD & PELVIS W/CONTRAST: CPT | Mod: 26

## 2025-08-27 PROCEDURE — 99285 EMERGENCY DEPT VISIT HI MDM: CPT

## 2025-08-27 RX ORDER — KETOROLAC TROMETHAMINE 30 MG/ML
15 INJECTION, SOLUTION INTRAMUSCULAR; INTRAVENOUS ONCE
Refills: 0 | Status: DISCONTINUED | OUTPATIENT
Start: 2025-08-27 | End: 2025-08-27

## 2025-08-27 RX ADMIN — KETOROLAC TROMETHAMINE 15 MILLIGRAM(S): 30 INJECTION, SOLUTION INTRAMUSCULAR; INTRAVENOUS at 17:54

## 2025-08-28 LAB
CULTURE RESULTS: SIGNIFICANT CHANGE UP
SPECIMEN SOURCE: SIGNIFICANT CHANGE UP

## 2025-09-04 ENCOUNTER — APPOINTMENT (OUTPATIENT)
Dept: UROLOGY | Facility: CLINIC | Age: 25
End: 2025-09-04

## 2025-09-04 PROBLEM — Z00.00 ENCOUNTER FOR PREVENTIVE HEALTH EXAMINATION: Status: ACTIVE | Noted: 2025-09-04

## 2025-09-09 ENCOUNTER — EMERGENCY (EMERGENCY)
Facility: HOSPITAL | Age: 25
LOS: 1 days | End: 2025-09-09
Admitting: STUDENT IN AN ORGANIZED HEALTH CARE EDUCATION/TRAINING PROGRAM
Payer: MEDICAID

## 2025-09-09 VITALS
RESPIRATION RATE: 16 BRPM | DIASTOLIC BLOOD PRESSURE: 94 MMHG | SYSTOLIC BLOOD PRESSURE: 137 MMHG | HEART RATE: 79 BPM | OXYGEN SATURATION: 98 % | TEMPERATURE: 98 F

## 2025-09-09 PROCEDURE — 99284 EMERGENCY DEPT VISIT MOD MDM: CPT

## 2025-09-09 RX ORDER — HALOPERIDOL 10 MG/1
2 TABLET ORAL ONCE
Refills: 0 | Status: COMPLETED | OUTPATIENT
Start: 2025-09-09 | End: 2025-09-09

## 2025-09-09 RX ADMIN — HALOPERIDOL 2 MILLIGRAM(S): 10 TABLET ORAL at 22:07
